# Patient Record
Sex: FEMALE | Race: WHITE | NOT HISPANIC OR LATINO | Employment: STUDENT | URBAN - METROPOLITAN AREA
[De-identification: names, ages, dates, MRNs, and addresses within clinical notes are randomized per-mention and may not be internally consistent; named-entity substitution may affect disease eponyms.]

---

## 2018-05-17 ENCOUNTER — TELEPHONE (OUTPATIENT)
Dept: MATERNAL FETAL MEDICINE | Facility: CLINIC | Age: 23
End: 2018-05-17

## 2018-05-17 DIAGNOSIS — O35.9XX0 KNOWN FETAL ANOMALY, ANTEPARTUM, SINGLE OR UNSPECIFIED FETUS: Primary | ICD-10-CM

## 2018-05-17 NOTE — TELEPHONE ENCOUNTER
Pt informed of upcoming MFM and pediatric surgery appointments on 5/23/18. MFM at [facility] reviewed location information and then Dr. Armenta at [facility] - clinic 6th floor after NICU tour. Pt verbalized understanding of information.

## 2018-05-23 ENCOUNTER — OFFICE VISIT (OUTPATIENT)
Dept: SURGERY | Facility: CLINIC | Age: 23
End: 2018-05-23
Attending: SURGERY
Payer: MEDICAID

## 2018-05-23 ENCOUNTER — SOCIAL WORK (OUTPATIENT)
Dept: CASE MANAGEMENT | Facility: OTHER | Age: 23
End: 2018-05-23

## 2018-05-23 ENCOUNTER — OFFICE VISIT (OUTPATIENT)
Dept: MATERNAL FETAL MEDICINE | Facility: CLINIC | Age: 23
End: 2018-05-23
Attending: OBSTETRICS & GYNECOLOGY
Payer: MEDICAID

## 2018-05-23 DIAGNOSIS — O35.9XX0 KNOWN FETAL ANOMALY, ANTEPARTUM, SINGLE OR UNSPECIFIED FETUS: ICD-10-CM

## 2018-05-23 DIAGNOSIS — O35.9XX0 KNOWN FETAL ANOMALY, ANTEPARTUM, SINGLE OR UNSPECIFIED FETUS: Primary | ICD-10-CM

## 2018-05-23 PROCEDURE — 99201 PR OFFICE/OUTPT VISIT,NEW,LEVL I: CPT | Mod: S$PBB,,, | Performed by: SURGERY

## 2018-05-23 PROCEDURE — 76815 OB US LIMITED FETUS(S): CPT | Mod: PBBFAC | Performed by: OBSTETRICS & GYNECOLOGY

## 2018-05-23 PROCEDURE — 76819 FETAL BIOPHYS PROFIL W/O NST: CPT | Mod: PBBFAC | Performed by: OBSTETRICS & GYNECOLOGY

## 2018-05-23 PROCEDURE — 99213 OFFICE O/P EST LOW 20 MIN: CPT | Mod: S$PBB,TH,25, | Performed by: OBSTETRICS & GYNECOLOGY

## 2018-05-23 PROCEDURE — 99999 PR PBB SHADOW E&M-EST. PATIENT-LVL II: CPT | Mod: PBBFAC,,, | Performed by: SURGERY

## 2018-05-23 PROCEDURE — 99212 OFFICE O/P EST SF 10 MIN: CPT | Mod: PBBFAC | Performed by: SURGERY

## 2018-05-23 PROCEDURE — 76819 FETAL BIOPHYS PROFIL W/O NST: CPT | Mod: 26,S$PBB,, | Performed by: OBSTETRICS & GYNECOLOGY

## 2018-05-23 PROCEDURE — 76815 OB US LIMITED FETUS(S): CPT | Mod: 26,S$PBB,, | Performed by: OBSTETRICS & GYNECOLOGY

## 2018-05-23 NOTE — PROGRESS NOTES
Tour of NICU was provided to pt and . They were grateful for the opportunity. All questions were answered regarding a NICU admission up to this point. NICU handbook provided.      Courtney Lafont, LCSW [facility] [place]  [email or web address]    (phone) [phone] or  Ext. [phone]  (fax) [phone]

## 2018-05-23 NOTE — PROGRESS NOTES
See viewpoint US report  Discussed delivery and timing.  Patient would like some type of sedation for her epidural as she gets very anxious with the procedure.  I will have her talk with anesthesia next visit.  Consider placing epidural the night before after a reactive NST.  The patient was given an opportunity to ask questions about management and the diease process.  She expressed an understanding of and agreement to the above impression and plan. All questions were answered to her satisfaction.  She was given contact information to the [facility] clinic to address further concerns.      The approximate physician face-to-face time was 15 minutes. The majority of the time (>50%) was spent on counseling of the patient or coordination of care.

## 2018-05-24 NOTE — PROGRESS NOTES
23 year old  at 37 w 4 d  Female fetus with a left sided mass suspected to be of renal origin.    Scheduled induction on 6/3 for delivery on .  Two prior vaginal deliveries.    Discussed differential diagnosis with parents and likely plan for post- imaging and then likely resection.    Expect the baby to be asymptomatic and may be able to to to [facility] while obtaining imaging and surgical planning.    V Geovany

## 2018-05-31 DIAGNOSIS — O35.9XX0 KNOWN FETAL ANOMALY, ANTEPARTUM, SINGLE OR UNSPECIFIED FETUS: Primary | ICD-10-CM

## 2018-06-01 ENCOUNTER — OFFICE VISIT (OUTPATIENT)
Dept: ANESTHESIOLOGY | Facility: OTHER | Age: 23
End: 2018-06-01
Attending: OBSTETRICS & GYNECOLOGY
Payer: MEDICAID

## 2018-06-01 ENCOUNTER — OFFICE VISIT (OUTPATIENT)
Dept: MATERNAL FETAL MEDICINE | Facility: CLINIC | Age: 23
End: 2018-06-01
Attending: OBSTETRICS & GYNECOLOGY
Payer: MEDICAID

## 2018-06-01 VITALS — WEIGHT: 179.25 LBS | DIASTOLIC BLOOD PRESSURE: 76 MMHG | SYSTOLIC BLOOD PRESSURE: 100 MMHG

## 2018-06-01 DIAGNOSIS — F41.9 ANXIETY: Primary | ICD-10-CM

## 2018-06-01 DIAGNOSIS — O35.9XX0 KNOWN FETAL ANOMALY, ANTEPARTUM, SINGLE OR UNSPECIFIED FETUS: ICD-10-CM

## 2018-06-01 PROCEDURE — 99212 OFFICE O/P EST SF 10 MIN: CPT | Mod: PBBFAC,TH,25 | Performed by: OBSTETRICS & GYNECOLOGY

## 2018-06-01 PROCEDURE — 76815 OB US LIMITED FETUS(S): CPT | Mod: PBBFAC | Performed by: OBSTETRICS & GYNECOLOGY

## 2018-06-01 PROCEDURE — 99499 UNLISTED E&M SERVICE: CPT | Mod: S$PBB,,, | Performed by: OBSTETRICS & GYNECOLOGY

## 2018-06-01 PROCEDURE — 76819 FETAL BIOPHYS PROFIL W/O NST: CPT | Mod: 26,S$PBB,, | Performed by: OBSTETRICS & GYNECOLOGY

## 2018-06-01 PROCEDURE — 76815 OB US LIMITED FETUS(S): CPT | Mod: 26,S$PBB,, | Performed by: OBSTETRICS & GYNECOLOGY

## 2018-06-01 PROCEDURE — 99999 PR PBB SHADOW E&M-EST. PATIENT-LVL II: CPT | Mod: PBBFAC,,, | Performed by: OBSTETRICS & GYNECOLOGY

## 2018-06-01 PROCEDURE — 76819 FETAL BIOPHYS PROFIL W/O NST: CPT | Mod: PBBFAC | Performed by: OBSTETRICS & GYNECOLOGY

## 2018-06-01 PROCEDURE — 99212 OFFICE O/P EST SF 10 MIN: CPT | Mod: ,,, | Performed by: ANESTHESIOLOGY

## 2018-06-01 NOTE — LETTER
June 4, 2018      Joe Mclean MD  [address]  [facility] [address]           [facility]  [address]  [place] [address]  Phone: [phone]          Patient: Marcela Real   MR Number: [id number]   YOB: 1995   Date of Visit: 6/1/2018       Dear Dr. Joe Mclean:    Thank you for referring Marcela Real to me for evaluation. Attached you will find relevant portions of my assessment and plan of care.    If you have questions, please do not hesitate to call me. I look forward to following Marcela Real along with you.    Sincerely,    Danielle Thompson MD    Enclosure  CC:  No Recipients    If you would like to receive this communication electronically, please contact [email or web address] or [phone] to request more information on [facility] access.    For providers and/or their staff who would like to refer a patient to [facility], please contact us through our one-stop-shop provider referral line, [facility], at [phone].    If you feel you have received this communication in error or would no longer like to receive these types of communications, please e-mail [email or web address]

## 2018-06-01 NOTE — CONSULTS
Consults     Outpatient OB Anesthesia Consult      Date and Time: 06/01/2018 11:06 AM     Request from: Dr. Mclean    CC requesting physician or midwife: Dr. Mclean     Reason for Consult: Anesthetic recommendations for delivery    Initial Consult?: Yes    Chief Complaint: anxiety regarding epidural placement    HPI: Patient is a 23 y.o. year old woman, G 3 P 2 presenting with anxiety regarding epidural placement.     Past medical history:    No past medical history on file.    Past surgical history:    No past surgical history on file.    Family history:    No family history on file.    Social History:    Social History     Social History    Marital status: Single     Spouse name: N/A    Number of children: N/A    Years of education: N/A     Occupational History    Not on file.     Social History Main Topics    Smoking status: Never Smoker    Smokeless tobacco: Never Used    Alcohol use No    Drug use: Unknown    Sexual activity: Yes     Partners: Male     Other Topics Concern    Not on file     Social History Narrative    No narrative on file       Medication:    Current Outpatient Prescriptions on File Prior to Visit   Medication Sig Dispense Refill    prenatal vit,calc76/iron/folic (PNV 29-1 ORAL) Take by mouth.       No current facility-administered medications on file prior to visit.        Allergies:    Patient has no known allergies.    Family or personal history of anesthetic complications: No    Diagnostic Studies: I have reviewed the following relevant findings as noted below:  CBC:No results found for: WBC, HGB, HCT, MCV, PLT   CMP:  No results found for: NA, K, CL, CO2, GLU, BUN, CREATININE, CALCIUM, PROT, ALBUMIN, BILITOT, ALKPHOS, AST, ALT, ANIONGAP, ESTGFRAFRICA, EGFRNONAA  BMP: No results found for: NA, K, CL, CO2, BUN, CREATININE, CALCIUM, ANIONGAP, ESTGFRAFRICA, EGFRNONAA  Coagulation studies: No results found for: PT, INR, APTT  Echo: No results found in the last 24 hours.  CT:  No results found in the last 24 hours.  MRI: No results found in the last 24 hours.      Review of Systems:   Constitutional: Negative for fever and chills; well appearing female  Eyes: no visual changes  Ear, nose, mouth and throat: no loose or broken teeth  Cardiovascular: no chest pain  Respiratory: no shortness of breath  Gastrointestinal: no nausea or vomiting  Genitourinary: no dysuria  Musculoskeletal: no joint pain  Skin: warm and dry, no rashes  Neurologic: no seizures  Psychiatric: no anxiety or depression  Endocrinology: no heat or cold intolerance  Hematologic: does not bruise or bleed easily      Physical Exam:  Vitals:    Blood Pressure: 100/76   Respiratory Rate: 14  Constitutional: alert, no distress  Eyes: normal lids, pupils symmetric  Ear, nose, mouth and throat: Mallampati I, normal thyromental distance, normal lips, teeth, gums and tongue   Cardiovascular: normal rate, no murmurs  Respiratory: normal effort, no wheezes  Musculoskeletal: normal gait, normal range of motion  Skin: no rashes, no induration  Neurologic: normal reflexes and sensation  Psychiatric: oriented to person, place, time; normal affect    ASA Score: 2    Diagnosis: anxiety regarding epidural placement    Assessment and Plan:  Patient is  at 38w6d who presents with anxiety regarding epidural placement. She states that she jumped during her prior epidural placement and is concerned that she will move again during placement. I explained that many patients do move/jump during skin localization and that is completely normal. I told her that we could give her something for anxiety and I recommended early epidural placement for her induction on [place] 3. I explained that we would localize her skin and we can give additional skin localization, should she need it.    Complexity: Moderate    Entertained and answered all questions to the patient's satisfaction.      Danielle Thompson MD

## 2018-06-03 ENCOUNTER — HOSPITAL ENCOUNTER (INPATIENT)
Facility: OTHER | Age: 23
LOS: 3 days | Discharge: HOME | End: 2018-06-06
Attending: OBSTETRICS & GYNECOLOGY | Admitting: OBSTETRICS & GYNECOLOGY
Payer: MEDICAID

## 2018-06-03 ENCOUNTER — ANESTHESIA EVENT (OUTPATIENT)
Dept: OBSTETRICS AND GYNECOLOGY | Facility: OTHER | Age: 23
End: 2018-06-03
Payer: MEDICAID

## 2018-06-03 ENCOUNTER — ANESTHESIA (OUTPATIENT)
Dept: OBSTETRICS AND GYNECOLOGY | Facility: OTHER | Age: 23
End: 2018-06-03
Payer: MEDICAID

## 2018-06-03 LAB
ABO + RH BLD: NORMAL
BASOPHILS # BLD AUTO: 0.01 K/UL
BASOPHILS NFR BLD: 0.1 %
BLD GP AB SCN CELLS X3 SERPL QL: NORMAL
DIFFERENTIAL METHOD: ABNORMAL
EOSINOPHIL # BLD AUTO: 0.1 K/UL
EOSINOPHIL NFR BLD: 0.5 %
ERYTHROCYTE [DISTWIDTH] IN BLOOD BY AUTOMATED COUNT: 13.5 %
HCT VFR BLD AUTO: 35 %
HGB BLD-MCNC: 11.6 G/DL
LYMPHOCYTES # BLD AUTO: 2.4 K/UL
LYMPHOCYTES NFR BLD: 17.6 %
MCH RBC QN AUTO: 29.8 PG
MCHC RBC AUTO-ENTMCNC: 33.1 G/DL
MCV RBC AUTO: 90 FL
MONOCYTES # BLD AUTO: 0.7 K/UL
MONOCYTES NFR BLD: 5.4 %
NEUTROPHILS # BLD AUTO: 10.2 K/UL
NEUTROPHILS NFR BLD: 76 %
PLATELET # BLD AUTO: 237 K/UL
PMV BLD AUTO: 9.8 FL
RBC # BLD AUTO: 3.89 M/UL
WBC # BLD AUTO: 13.44 K/UL

## 2018-06-03 PROCEDURE — 86901 BLOOD TYPING SEROLOGIC RH(D): CPT

## 2018-06-03 PROCEDURE — 11000001 HC ACUTE MED/SURG PRIVATE ROOM

## 2018-06-03 PROCEDURE — 85025 COMPLETE CBC W/AUTO DIFF WBC: CPT

## 2018-06-03 PROCEDURE — 86592 SYPHILIS TEST NON-TREP QUAL: CPT

## 2018-06-03 PROCEDURE — 36415 COLL VENOUS BLD VENIPUNCTURE: CPT

## 2018-06-03 PROCEDURE — 25000003 PHARM REV CODE 250: Performed by: STUDENT IN AN ORGANIZED HEALTH CARE EDUCATION/TRAINING PROGRAM

## 2018-06-03 PROCEDURE — 86703 HIV-1/HIV-2 1 RESULT ANTBDY: CPT

## 2018-06-03 PROCEDURE — 3E0P7VZ INTRODUCTION OF HORMONE INTO FEMALE REPRODUCTIVE, VIA NATURAL OR ARTIFICIAL OPENING: ICD-10-PCS | Performed by: OBSTETRICS & GYNECOLOGY

## 2018-06-03 RX ORDER — OXYTOCIN/RINGER'S LACTATE 20/1000 ML
20 PLASTIC BAG, INJECTION (ML) INTRAVENOUS ONCE
Status: CANCELLED | OUTPATIENT
Start: 2018-06-03 | End: 2018-06-03

## 2018-06-03 RX ORDER — SODIUM CHLORIDE, SODIUM LACTATE, POTASSIUM CHLORIDE, CALCIUM CHLORIDE 600; 310; 30; 20 MG/100ML; MG/100ML; MG/100ML; MG/100ML
INJECTION, SOLUTION INTRAVENOUS CONTINUOUS
Status: DISCONTINUED | OUTPATIENT
Start: 2018-06-03 | End: 2018-06-04

## 2018-06-03 RX ORDER — METHYLERGONOVINE MALEATE 0.2 MG/ML
200 INJECTION INTRAVENOUS
Status: CANCELLED | OUTPATIENT
Start: 2018-06-03

## 2018-06-03 RX ORDER — MISOPROSTOL 200 UG/1
600 TABLET ORAL
Status: CANCELLED | OUTPATIENT
Start: 2018-06-03

## 2018-06-03 RX ORDER — SODIUM CHLORIDE 9 MG/ML
INJECTION, SOLUTION INTRAVENOUS
Status: DISCONTINUED | OUTPATIENT
Start: 2018-06-03 | End: 2018-06-04

## 2018-06-03 RX ORDER — ONDANSETRON 8 MG/1
8 TABLET, ORALLY DISINTEGRATING ORAL EVERY 8 HOURS PRN
Status: DISCONTINUED | OUTPATIENT
Start: 2018-06-03 | End: 2018-06-04

## 2018-06-03 RX ORDER — OXYTOCIN/RINGER'S LACTATE 20/1000 ML
41.7 PLASTIC BAG, INJECTION (ML) INTRAVENOUS CONTINUOUS
Status: DISCONTINUED | OUTPATIENT
Start: 2018-06-03 | End: 2018-06-04

## 2018-06-03 RX ORDER — OXYTOCIN/RINGER'S LACTATE 20/1000 ML
41.65 PLASTIC BAG, INJECTION (ML) INTRAVENOUS CONTINUOUS
Status: CANCELLED | OUTPATIENT
Start: 2018-06-03 | End: 2018-06-04

## 2018-06-03 RX ORDER — OXYTOCIN/RINGER'S LACTATE 20/1000 ML
333 PLASTIC BAG, INJECTION (ML) INTRAVENOUS CONTINUOUS
Status: ACTIVE | OUTPATIENT
Start: 2018-06-03 | End: 2018-06-03

## 2018-06-03 RX ORDER — CARBOPROST TROMETHAMINE 250 UG/ML
250 INJECTION, SOLUTION INTRAMUSCULAR
Status: CANCELLED | OUTPATIENT
Start: 2018-06-03

## 2018-06-03 RX ORDER — OXYTOCIN/RINGER'S LACTATE 20/1000 ML
20 PLASTIC BAG, INJECTION (ML) INTRAVENOUS ONCE
Status: DISCONTINUED | OUTPATIENT
Start: 2018-06-03 | End: 2018-06-04

## 2018-06-03 RX ADMIN — SODIUM CHLORIDE, SODIUM LACTATE, POTASSIUM CHLORIDE, AND CALCIUM CHLORIDE: .6; .31; .03; .02 INJECTION, SOLUTION INTRAVENOUS at 09:06

## 2018-06-03 RX ADMIN — DINOPROSTONE 10 MG: 10 INSERT VAGINAL at 10:06

## 2018-06-04 LAB
ALLENS TEST: ABNORMAL
ALLENS TEST: ABNORMAL
DELSYS: ABNORMAL
DELSYS: ABNORMAL
HCO3 UR-SCNC: 21.6 MMOL/L (ref 24–28)
HCO3 UR-SCNC: 23.8 MMOL/L (ref 24–28)
HIV 1+2 AB+HIV1 P24 AG SERPL QL IA: NEGATIVE
MODE: ABNORMAL
MODE: ABNORMAL
PCO2 BLDA: 32.9 MMHG (ref 35–45)
PCO2 BLDA: 46 MMHG (ref 35–45)
PH SMN: 7.32 [PH] (ref 7.35–7.45)
PH SMN: 7.42 [PH] (ref 7.35–7.45)
PO2 BLDA: 17 MMHG (ref 80–100)
PO2 BLDA: 33 MMHG (ref 80–100)
POC BE: -2 MMOL/L
POC BE: -3 MMOL/L
POC SATURATED O2: 22 % (ref 95–100)
POC SATURATED O2: 66 % (ref 95–100)
POCT GLUCOSE: 69 MG/DL (ref 70–110)
RPR SER QL: NORMAL
SAMPLE: ABNORMAL
SAMPLE: ABNORMAL
SITE: ABNORMAL
SITE: ABNORMAL

## 2018-06-04 PROCEDURE — 25000003 PHARM REV CODE 250: Performed by: OBSTETRICS & GYNECOLOGY

## 2018-06-04 PROCEDURE — 25000003 PHARM REV CODE 250: Performed by: STUDENT IN AN ORGANIZED HEALTH CARE EDUCATION/TRAINING PROGRAM

## 2018-06-04 PROCEDURE — 59409 OBSTETRICAL CARE: CPT | Mod: GB,,, | Performed by: OBSTETRICS & GYNECOLOGY

## 2018-06-04 PROCEDURE — 27200710 HC EPIDURAL INFUSION PUMP SET: Performed by: STUDENT IN AN ORGANIZED HEALTH CARE EDUCATION/TRAINING PROGRAM

## 2018-06-04 PROCEDURE — 62326 NJX INTERLAMINAR LMBR/SAC: CPT | Performed by: ANESTHESIOLOGY

## 2018-06-04 PROCEDURE — 82803 BLOOD GASES ANY COMBINATION: CPT

## 2018-06-04 PROCEDURE — 10907ZC DRAINAGE OF AMNIOTIC FLUID, THERAPEUTIC FROM PRODUCTS OF CONCEPTION, VIA NATURAL OR ARTIFICIAL OPENING: ICD-10-PCS | Performed by: OBSTETRICS & GYNECOLOGY

## 2018-06-04 PROCEDURE — 63600175 PHARM REV CODE 636 W HCPCS: Performed by: STUDENT IN AN ORGANIZED HEALTH CARE EDUCATION/TRAINING PROGRAM

## 2018-06-04 PROCEDURE — 72100003 HC LABOR CARE, EA. ADDL. 8 HRS

## 2018-06-04 PROCEDURE — 72100002 HC LABOR CARE, 1ST 8 HOURS

## 2018-06-04 PROCEDURE — 59409 OBSTETRICAL CARE: CPT | Mod: AA,,, | Performed by: ANESTHESIOLOGY

## 2018-06-04 PROCEDURE — 99900035 HC TECH TIME PER 15 MIN (STAT)

## 2018-06-04 PROCEDURE — 27800517 HC TRAY,EPIDURAL-CONTINUOUS: Performed by: STUDENT IN AN ORGANIZED HEALTH CARE EDUCATION/TRAINING PROGRAM

## 2018-06-04 PROCEDURE — 72200005 HC VAGINAL DELIVERY LEVEL II

## 2018-06-04 PROCEDURE — 51702 INSERT TEMP BLADDER CATH: CPT

## 2018-06-04 PROCEDURE — 11000001 HC ACUTE MED/SURG PRIVATE ROOM

## 2018-06-04 RX ORDER — OXYTOCIN/RINGER'S LACTATE 20/1000 ML
2 PLASTIC BAG, INJECTION (ML) INTRAVENOUS CONTINUOUS
Status: DISCONTINUED | OUTPATIENT
Start: 2018-06-04 | End: 2018-06-04

## 2018-06-04 RX ORDER — HYDROCODONE BITARTRATE AND ACETAMINOPHEN 10; 325 MG/1; MG/1
1 TABLET ORAL EVERY 4 HOURS PRN
Status: DISCONTINUED | OUTPATIENT
Start: 2018-06-04 | End: 2018-06-06 | Stop reason: HOSPADM

## 2018-06-04 RX ORDER — MISOPROSTOL 200 UG/1
200 TABLET ORAL EVERY 6 HOURS
Status: COMPLETED | OUTPATIENT
Start: 2018-06-04 | End: 2018-06-05

## 2018-06-04 RX ORDER — MIDAZOLAM HYDROCHLORIDE 1 MG/ML
INJECTION INTRAMUSCULAR; INTRAVENOUS
Status: DISCONTINUED | OUTPATIENT
Start: 2018-06-04 | End: 2018-06-04

## 2018-06-04 RX ORDER — MISOPROSTOL 100 UG/1
100 TABLET ORAL EVERY 6 HOURS
Status: DISCONTINUED | OUTPATIENT
Start: 2018-06-04 | End: 2018-06-04

## 2018-06-04 RX ORDER — IBUPROFEN 600 MG/1
600 TABLET ORAL EVERY 6 HOURS
Status: DISCONTINUED | OUTPATIENT
Start: 2018-06-04 | End: 2018-06-06 | Stop reason: HOSPADM

## 2018-06-04 RX ORDER — CARBOPROST TROMETHAMINE 250 UG/ML
INJECTION, SOLUTION INTRAMUSCULAR
Status: DISCONTINUED
Start: 2018-06-04 | End: 2018-06-04 | Stop reason: WASHOUT

## 2018-06-04 RX ORDER — OXYTOCIN/RINGER'S LACTATE 20/1000 ML
41.65 PLASTIC BAG, INJECTION (ML) INTRAVENOUS CONTINUOUS
Status: ACTIVE | OUTPATIENT
Start: 2018-06-04 | End: 2018-06-05

## 2018-06-04 RX ORDER — LIDOCAINE HYDROCHLORIDE AND EPINEPHRINE 15; 5 MG/ML; UG/ML
INJECTION, SOLUTION EPIDURAL
Status: DISCONTINUED | OUTPATIENT
Start: 2018-06-04 | End: 2018-06-04

## 2018-06-04 RX ORDER — HYDROCODONE BITARTRATE AND ACETAMINOPHEN 5; 325 MG/1; MG/1
1 TABLET ORAL EVERY 4 HOURS PRN
Status: DISCONTINUED | OUTPATIENT
Start: 2018-06-04 | End: 2018-06-06 | Stop reason: HOSPADM

## 2018-06-04 RX ORDER — METHYLERGONOVINE MALEATE 0.2 MG/ML
INJECTION INTRAVENOUS
Status: DISCONTINUED
Start: 2018-06-04 | End: 2018-06-04 | Stop reason: WASHOUT

## 2018-06-04 RX ORDER — FENTANYL/BUPIVACAINE/NS/PF 2MCG/ML-.1
PLASTIC BAG, INJECTION (ML) INJECTION CONTINUOUS PRN
Status: DISCONTINUED | OUTPATIENT
Start: 2018-06-04 | End: 2018-06-04

## 2018-06-04 RX ORDER — MISOPROSTOL 200 UG/1
TABLET ORAL
Status: COMPLETED
Start: 2018-06-04 | End: 2018-06-04

## 2018-06-04 RX ORDER — DEXTROSE, SODIUM CHLORIDE, SODIUM LACTATE, POTASSIUM CHLORIDE, AND CALCIUM CHLORIDE 5; .6; .31; .03; .02 G/100ML; G/100ML; G/100ML; G/100ML; G/100ML
INJECTION, SOLUTION INTRAVENOUS CONTINUOUS
Status: DISCONTINUED | OUTPATIENT
Start: 2018-06-04 | End: 2018-06-04

## 2018-06-04 RX ADMIN — SODIUM CHLORIDE, SODIUM LACTATE, POTASSIUM CHLORIDE, AND CALCIUM CHLORIDE 1000 ML: .6; .31; .03; .02 INJECTION, SOLUTION INTRAVENOUS at 04:06

## 2018-06-04 RX ADMIN — DEXTROSE, SODIUM CHLORIDE, SODIUM LACTATE, POTASSIUM CHLORIDE, AND CALCIUM CHLORIDE: 5; .6; .31; .03; .02 INJECTION, SOLUTION INTRAVENOUS at 01:06

## 2018-06-04 RX ADMIN — MIDAZOLAM HYDROCHLORIDE 2 MG: 1 INJECTION, SOLUTION INTRAMUSCULAR; INTRAVENOUS at 04:06

## 2018-06-04 RX ADMIN — SODIUM CHLORIDE: 0.9 INJECTION, SOLUTION INTRAVENOUS at 04:06

## 2018-06-04 RX ADMIN — Medication 10 ML/HR: at 04:06

## 2018-06-04 RX ADMIN — Medication 2 MILLI-UNITS/MIN: at 10:06

## 2018-06-04 RX ADMIN — IBUPROFEN 600 MG: 600 TABLET, FILM COATED ORAL at 10:06

## 2018-06-04 RX ADMIN — MISOPROSTOL 200 MCG: 200 TABLET ORAL at 10:06

## 2018-06-04 RX ADMIN — LIDOCAINE HYDROCHLORIDE,EPINEPHRINE BITARTRATE 3 ML: 15; .005 INJECTION, SOLUTION EPIDURAL; INFILTRATION; INTRACAUDAL; PERINEURAL at 04:06

## 2018-06-04 RX ADMIN — HYDROCODONE BITARTRATE AND ACETAMINOPHEN 1 TABLET: 5; 325 TABLET ORAL at 10:06

## 2018-06-04 RX ADMIN — SODIUM CHLORIDE, SODIUM LACTATE, POTASSIUM CHLORIDE, AND CALCIUM CHLORIDE: .6; .31; .03; .02 INJECTION, SOLUTION INTRAVENOUS at 10:06

## 2018-06-04 RX ADMIN — SODIUM CHLORIDE, SODIUM LACTATE, POTASSIUM CHLORIDE, AND CALCIUM CHLORIDE 500 ML: .6; .31; .03; .02 INJECTION, SOLUTION INTRAVENOUS at 03:06

## 2018-06-04 RX ADMIN — Medication 2 MILLI-UNITS/MIN: at 05:06

## 2018-06-04 NOTE — PLAN OF CARE
Problem: Labor (Cervical Ripen, Induct, Augment) (Adult,Obstetrics,Pediatric)  Goal: Signs and Symptoms of Listed Potential Problems Will be Absent, Minimized or Managed (Labor)  Signs and symptoms of listed potential problems will be absent, minimized or managed by discharge/transition of care (reference Labor (Cervical Ripen, Induct, Augment) (Adult,Obstetrics,Pediatric) CPG).   PT with epidural in place. No pain or needs expressed. Will continue to monitor.

## 2018-06-04 NOTE — ANESTHESIA PROCEDURE NOTES
Epidural    Patient location during procedure: OB   Reason for block: primary anesthetic   Diagnosis: Labor   Start time: 6/4/2018 4:39 AM  Timeout: 6/4/2018 4:38 AM  End time: 6/4/2018 4:54 AM  Surgery related to: Vaginal Delivery  Staffing  Anesthesiologist: TIGRE RIOS  Resident/CRNA: JUAN YORK  Performed: resident/CRNA   Preanesthetic Checklist  Completed: patient identified, site marked, surgical consent, pre-op evaluation, timeout performed, IV checked, risks and benefits discussed, monitors and equipment checked, anesthesia consent given, hand hygiene performed and patient being monitored  Preparation  Patient position: sitting  Prep: ChloraPrep  Patient monitoring: Pulse Ox  Epidural  Skin Anesthetic: lidocaine 1%  Skin Wheal: 3 mL  Administration type: continuous  Approach: midline  Interspace: L3-4  Injection technique: FOREIGN saline  Needle and Epidural Catheter  Needle type: Tuohy   Needle gauge: 17  Needle length: 3.5 inches  Needle insertion depth: 5 cm  Catheter type: springwound  Catheter size: 19 G  Catheter at skin depth: 10 cm  Test dose: 3 mL of lidocaine 1.5% with Epi 1-to-200,000  Additional Documentation: incremental injection, negative aspiration for heme and CSF, no paresthesia on injection, no signs/symptoms of IV or SA injection, no significant pain on injection and no significant complaints from patient  Needle localization: anatomical landmarks  Medications:  Bolus administered: 10 mL of  Volume per aspiration: 5 mL  Time between aspirations: 5 minutes  Assessment  Upper dermatomal levels - Left: T9  Right: T9   Dermatomal levels determined by ice  Ease of block: easy  Patient's tolerance of the procedure: comfortable throughout block and no complaints  Post dural Puncture Headache?: No

## 2018-06-04 NOTE — ASSESSMENT & PLAN NOTE
- Consents signed and to chart  - Admit to Labor and Delivery unit  - Plan for induction is cervidil   - Epidural per Anesthesia  - Draw CBC, T&S  - Notify Staff  - Ultrasound performed, fetus in vertex position.   - Recheck in 12 hrs or PRN  - Post-Partum Hemorrhage risk - low

## 2018-06-04 NOTE — ANESTHESIA PREPROCEDURE EVALUATION
2018  Marcela Real is a 23 y.o. female  at 39w1d with PMHx of  x2 at term who presents for IOL.     OB History    Para Term  AB Living   3 2 2     2   SAB TAB Ectopic Multiple Live Births           2      # Outcome Date GA Lbr Donald/2nd Weight Sex Delivery Anes PTL Lv   3 Current            2 Term 2014        MIKAYLA   1 Term 2012     Vag-Spont  N MIKAYLA          Wt Readings from Last 1 Encounters:   18 1016 81.3 kg (179 lb 3.7 oz)       BP Readings from Last 3 Encounters:   18 100/76   18 (!) 143/67       Patient Active Problem List   Diagnosis    Fetal tumor    Indication for care in labor or delivery       No past surgical history on file.    Social History     Social History    Marital status: Single     Spouse name: N/A    Number of children: N/A    Years of education: N/A     Occupational History    Not on file.     Social History Main Topics    Smoking status: Never Smoker    Smokeless tobacco: Never Used    Alcohol use No    Drug use: Unknown    Sexual activity: Yes     Partners: Male     Other Topics Concern    Not on file     Social History Narrative    No narrative on file         Chemistry    No results found for: NA, K, CL, CO2, BUN, CREATININE, GLU No results found for: CALCIUM, ALKPHOS, AST, ALT, BILITOT, ESTGFRAFRICA, EGFRNONAA         No results found for: WBC, HGB, HCT, MCV, PLT    No results for input(s): PT, INR, PROTIME, APTT in the last 72 hours.      Anesthesia Evaluation    I have reviewed the Patient Summary Reports.     I have reviewed the Medications.     Review of Systems  Anesthesia Hx:  No previous Anesthesia  Neg history of prior surgery. Denies Family Hx of Anesthesia complications.   Denies Personal Hx of Anesthesia complications.   Hematology/Oncology:  Hematology Normal   Oncology Normal     EENT/Dental:EENT/Dental  Normal   Cardiovascular:  Cardiovascular Normal     Pulmonary:  Pulmonary Normal    Renal/:  Renal/ Normal     Hepatic/GI:  Hepatic/GI Normal    Neurological:  Neurology Normal    Endocrine:  Endocrine Normal        Physical Exam  General:  Well nourished    Airway/Jaw/Neck:  Airway Findings: Mouth Opening: Normal Tongue: Normal  General Airway Assessment: Adult  Mallampati: II  Improves to I with phonation.  TM Distance: Normal, at least 6 cm     Eyes/Ears/Nose:  EYES/EARS/NOSE FINDINGS: Normal   Dental:  Dental Findings: In tact   Chest/Lungs:  Chest/Lungs Findings: Clear to auscultation, Normal Respiratory Rate     Heart/Vascular:  Heart Findings: Rate: Normal        Mental Status:  Mental Status Findings:  Cooperative, Alert and Oriented         Anesthesia Plan  Type of Anesthesia, risks & benefits discussed:  Anesthesia Type:  CSE, epidural, general, MAC, spinal  Patient's Preference:   Intra-op Monitoring Plan: standard ASA monitors  Intra-op Monitoring Plan Comments:   Post Op Pain Control Plan:   Post Op Pain Control Plan Comments:   Induction:   IV  Beta Blocker:  Patient is not currently on a Beta-Blocker (No further documentation required).       Informed Consent: Patient understands risks and agrees with Anesthesia plan.  Questions answered. Anesthesia consent signed with patient.  ASA Score: 2     Day of Surgery Review of History & Physical: I have interviewed and examined the patient. I have reviewed the patient's H&P dated:    H&P update referred to the surgeon.         Ready For Surgery From Anesthesia Perspective.

## 2018-06-04 NOTE — SUBJECTIVE & OBJECTIVE
Obstetric History       T2      L2     SAB0   TAB0   Ectopic0   Multiple0   Live Births2       # Outcome Date GA Lbr Donald/2nd Weight Sex Delivery Anes PTL Lv   3 Current            2 Term         MIKAYLA   1 Term      Vag-Spont  N MIKAYLA        No past medical history on file.  No past surgical history on file.    PTA Medications   Medication Sig    prenatal vit,calc76/iron/folic (PNV 29-1 ORAL) Take by mouth.       Review of patient's allergies indicates:  No Known Allergies     Family History     None        Social History Main Topics    Smoking status: Never Smoker    Smokeless tobacco: Never Used    Alcohol use No    Drug use: Unknown    Sexual activity: Yes     Partners: Male     Review of Systems   Constitutional: Negative for chills and fever.   Respiratory: Negative for shortness of breath.    Cardiovascular: Negative for chest pain and leg swelling.   Gastrointestinal: Negative for abdominal pain, nausea and vomiting.   Genitourinary: Negative for dysuria, pelvic pain and vaginal bleeding.   Neurological: Negative for headaches.      Objective:     Vital Signs (Most Recent):    Vital Signs (24h Range):           There is no height or weight on file to calculate BMI.    FHT: 145 bpm, mod btbv, +accels, - decels, Cat 1 (reassuring)  TOCO:  Q 10 minutes, irregular    Physical Exam:   Constitutional: She is oriented to person, place, and time. She appears well-developed and well-nourished. No distress.    HENT:   Head: Normocephalic and atraumatic.    Eyes: EOM are normal.     Cardiovascular: Normal rate and regular rhythm.  Exam reveals no edema.     Pulmonary/Chest: Effort normal and breath sounds normal.        Abdominal: Soft. Bowel sounds are normal. She exhibits no distension. There is no tenderness. There is no rebound and no guarding.   Gravid, size = dates     Genitourinary: Uterus normal.               Neurological: She is alert and oriented to person, place, and time.    Skin:  Skin is warm and dry.    Psychiatric: She has a normal mood and affect.       Cervix:  Dilation:  1  Effacement:  50%  Station: -3  Presentation: Vertex     Significant Labs:  No results found for: GROUPTRH, HEPBSAG, RUBELLAIGGSC, STREPBCULT, AFP, [id number]    I have personallly reviewed all pertinent lab results from the last 24 hours.

## 2018-06-04 NOTE — HPI
Marcela Real is a 23 y.o.  female with IUP at 39w1d weeks gestation who is admitted for an induction of labor secondary to fetal abdominal mass.     This IUP is complicated by fetal abdominal mass and h/o placental abruption.  Patient denies contractions, denies vaginal bleeding, denies LOF.   Fetal Movement: normal.

## 2018-06-04 NOTE — PROGRESS NOTES
Pt presents to L&D for scheduled induction. REports positive fm; denies lof, vb or ctx at this time. Pt shown to LDR 2. Oriented to room.

## 2018-06-04 NOTE — PROGRESS NOTES
LABOR NOTE    S:  Complaints: No.  Epidural working:  yes    O: /67   Pulse 80   Temp 99.2 °F (37.3 °C)   Resp 18   Wt 81.3 kg (179 lb 3.7 oz)   SpO2 97%   Breastfeeding? No     FHT: 130 bpm, moderate btbv, +accels, no decels,  Cat 1 (reassuring)  CTX: q 3-4 min   SVE: /-2  Pit @ 10mU/min    ASSESSMENT:   23 y.o.  at 39w2d, IOL    FHT reassuring    Active Hospital Problems    Diagnosis  POA    Indication for care in labor or delivery [O75.9]  Yes      Resolved Hospital Problems    Diagnosis Date Resolved POA   No resolved problems to display.     PLAN:    Continue Close Maternal/Fetal Monitoring  Labor course:   2300:  Cervidil  1000: 3/70/-2, AROM clear, start pitocin  1230: 3/70/-2, IUPC placed, pitocin @ 2 mU  1430: /-2, pit @ 10mU  Recheck 2 hours or PRN    Josette Roberto MD  PGY-3 OB/GYN  [phone]

## 2018-06-04 NOTE — PROGRESS NOTES
LABOR NOTE    S:  Complaints: No.  Epidural working:  yes    O: /64   Pulse 77   Temp 99.2 °F (37.3 °C)   Resp 18   Wt 81.3 kg (179 lb 3.7 oz)   SpO2 97%   Breastfeeding? No       FHT: 130bpm, moderate btbv, +accels, no decels,  Cat 1 (reassuring)  CTX: irregular  SVE: 3/70/-2      ASSESSMENT:   23 y.o.  at 39w2d, IOL    FHT reassuring    Active Hospital Problems    Diagnosis  POA    Indication for care in labor or delivery [O75.9]  Yes      Resolved Hospital Problems    Diagnosis Date Resolved POA   No resolved problems to display.         PLAN:    Continue Close Maternal/Fetal Monitoring  S/p cervidil  AROM clear  Start Pitocin Augmentation per protocol  Recheck 2 hours or PRN    Josette Roberto MD  PGY-3 OB/GYN  [phone]

## 2018-06-04 NOTE — PROGRESS NOTES
LABOR NOTE    S:  Complaints: No.  Epidural working:  yes    O: /70   Pulse 78   Temp 99.2 °F (37.3 °C)   Resp 18   Wt 81.3 kg (179 lb 3.7 oz)   SpO2 99%   Breastfeeding? No     FHT: 130 bpm, moderate btbv, +accels, no decels,  Cat 1 (reassuring)  CTX: q 3-4 min   SVE: 3/70/-2    ASSESSMENT:   23 y.o.  at 39w2d, IOL    FHT reassuring    Active Hospital Problems    Diagnosis  POA    Indication for care in labor or delivery [O75.9]  Yes      Resolved Hospital Problems    Diagnosis Date Resolved POA   No resolved problems to display.     PLAN:    Continue Close Maternal/Fetal Monitoring  Labor course:   2300:  Cervidil  1000: 3/70/-2, AROM clear, start pitocin  1230: 3/70/-2, IUPC placed, pitocin @ 2 mU  Recheck 2 hours or PRN    Francy Grant MD, PhD  OBGYN, PGY-2

## 2018-06-04 NOTE — HOSPITAL COURSE
2018 Patient admitted for scheduled IOL 2/2 fetal abd mass. Cervidil induction.   2018 PPD #1 s/p uncomplicated .  cc. No issues overnight. Bleeding is mild. Pain well controlled. Tolerating PO. Voiding spontaneously.   2018 PPD#2 Doing well. Meeting all post partum milestones. Medically stable for discharge to home today. Discharge instructions discussed. Routine follow up in 6 weeks.

## 2018-06-04 NOTE — H&P
[facility]  Obstetrics  History & Physical    Patient Name: Marcela Real  MRN: [id number]  Admission Date: 6/3/2018  Primary Care Provider: Primary Doctor No    Subjective:     Principal Problem:<principal problem not specified>    History of Present Illness:   Marcela Real is a 23 y.o.  female with IUP at 39w1d weeks gestation who is admitted for an induction of labor secondary to fetal abdominal mass.     This IUP is complicated by fetal abdominal mass and h/o placental abruption.  Patient denies contractions, denies vaginal bleeding, denies LOF.   Fetal Movement: normal.           Obstetric History       T2      L2     SAB0   TAB0   Ectopic0   Multiple0   Live Births2       # Outcome Date GA Lbr Donald/2nd Weight Sex Delivery Anes PTL Lv   3 Current            2 Term         MIKAYLA   1 Term      Vag-Spont  N MIKAYLA        No past medical history on file.  No past surgical history on file.    PTA Medications   Medication Sig    prenatal vit,calc76/iron/folic (PNV 29-1 ORAL) Take by mouth.       Review of patient's allergies indicates:  No Known Allergies     Family History     None        Social History Main Topics    Smoking status: Never Smoker    Smokeless tobacco: Never Used    Alcohol use No    Drug use: Unknown    Sexual activity: Yes     Partners: Male     Review of Systems   Constitutional: Negative for chills and fever.   Respiratory: Negative for shortness of breath.    Cardiovascular: Negative for chest pain and leg swelling.   Gastrointestinal: Negative for abdominal pain, nausea and vomiting.   Genitourinary: Negative for dysuria, pelvic pain and vaginal bleeding.   Neurological: Negative for headaches.      Objective:     Vital Signs (Most Recent):    Vital Signs (24h Range):           There is no height or weight on file to calculate BMI.    FHT: 145 bpm, mod btbv, +accels, - decels, Cat 1 (reassuring)  TOCO:  Q 10 minutes, irregular    Physical  Exam:   Constitutional: She is oriented to person, place, and time. She appears well-developed and well-nourished. No distress.    HENT:   Head: Normocephalic and atraumatic.    Eyes: EOM are normal.     Cardiovascular: Normal rate and regular rhythm.  Exam reveals no edema.     Pulmonary/Chest: Effort normal and breath sounds normal.        Abdominal: Soft. Bowel sounds are normal. She exhibits no distension. There is no tenderness. There is no rebound and no guarding.   Gravid, size = dates     Genitourinary: Uterus normal.               Neurological: She is alert and oriented to person, place, and time.    Skin: Skin is warm and dry.    Psychiatric: She has a normal mood and affect.       Cervix:  Dilation:  2  Effacement:  70%  Station: -3  Presentation: Vertex     Significant Labs:  No results found for: GROUPTRH, HEPBSAG, RUBELLAIGGSC, STREPBCULT, AFP, [id number]    I have personallly reviewed all pertinent lab results from the last 24 hours.    Assessment/Plan:     23 y.o. female  at 39w1d for:    Indication for care in labor or delivery    - Consents signed and to chart  - Admit to Labor and Delivery unit  - Plan for induction is cervdil  - Epidural per Anesthesia  - Draw CBC, T&S  - Notify Staff  - Ultrasound performed, fetus in vertex position.   - Recheck in 12 hrs or PRN  - Post-Partum Hemorrhage risk - low                  Shae Blank MD  Obstetrics  [facility]    Admit for induction  I have reviewed the resident's note, and agree with the diagnosis and management plan.

## 2018-06-05 LAB
BASOPHILS # BLD AUTO: 0.01 K/UL
BASOPHILS NFR BLD: 0.1 %
DIFFERENTIAL METHOD: ABNORMAL
EOSINOPHIL # BLD AUTO: 0.1 K/UL
EOSINOPHIL NFR BLD: 0.4 %
ERYTHROCYTE [DISTWIDTH] IN BLOOD BY AUTOMATED COUNT: 13.6 %
HCT VFR BLD AUTO: 30.6 %
HGB BLD-MCNC: 10 G/DL
LYMPHOCYTES # BLD AUTO: 2.7 K/UL
LYMPHOCYTES NFR BLD: 15.7 %
MCH RBC QN AUTO: 29.3 PG
MCHC RBC AUTO-ENTMCNC: 32.7 G/DL
MCV RBC AUTO: 90 FL
MONOCYTES # BLD AUTO: 0.9 K/UL
MONOCYTES NFR BLD: 5.5 %
NEUTROPHILS # BLD AUTO: 13.4 K/UL
NEUTROPHILS NFR BLD: 78 %
PLATELET # BLD AUTO: 221 K/UL
PMV BLD AUTO: 10.2 FL
RBC # BLD AUTO: 3.41 M/UL
WBC # BLD AUTO: 17.16 K/UL

## 2018-06-05 PROCEDURE — 85025 COMPLETE CBC W/AUTO DIFF WBC: CPT

## 2018-06-05 PROCEDURE — 72100003 HC LABOR CARE, EA. ADDL. 8 HRS

## 2018-06-05 PROCEDURE — 11000001 HC ACUTE MED/SURG PRIVATE ROOM

## 2018-06-05 PROCEDURE — 27000181 HC CABLE, IUPC

## 2018-06-05 PROCEDURE — 36415 COLL VENOUS BLD VENIPUNCTURE: CPT

## 2018-06-05 PROCEDURE — 25000003 PHARM REV CODE 250: Performed by: STUDENT IN AN ORGANIZED HEALTH CARE EDUCATION/TRAINING PROGRAM

## 2018-06-05 PROCEDURE — 99232 SBSQ HOSP IP/OBS MODERATE 35: CPT | Mod: ,,, | Performed by: OBSTETRICS & GYNECOLOGY

## 2018-06-05 RX ORDER — IBUPROFEN 600 MG/1
600 TABLET ORAL EVERY 6 HOURS
Qty: 30 TABLET | Refills: 1 | Status: SHIPPED | OUTPATIENT
Start: 2018-06-05

## 2018-06-05 RX ORDER — DOCUSATE SODIUM 100 MG/1
200 CAPSULE, LIQUID FILLED ORAL 2 TIMES DAILY PRN
Status: DISCONTINUED | OUTPATIENT
Start: 2018-06-05 | End: 2018-06-06 | Stop reason: HOSPADM

## 2018-06-05 RX ORDER — ACETAMINOPHEN 325 MG/1
650 TABLET ORAL EVERY 6 HOURS PRN
Status: DISCONTINUED | OUTPATIENT
Start: 2018-06-05 | End: 2018-06-06 | Stop reason: HOSPADM

## 2018-06-05 RX ORDER — DIPHENHYDRAMINE HCL 25 MG
25 CAPSULE ORAL EVERY 4 HOURS PRN
Status: DISCONTINUED | OUTPATIENT
Start: 2018-06-05 | End: 2018-06-06 | Stop reason: HOSPADM

## 2018-06-05 RX ORDER — ONDANSETRON 8 MG/1
8 TABLET, ORALLY DISINTEGRATING ORAL EVERY 8 HOURS PRN
Status: DISCONTINUED | OUTPATIENT
Start: 2018-06-05 | End: 2018-06-06 | Stop reason: HOSPADM

## 2018-06-05 RX ORDER — HYDROCORTISONE 25 MG/G
CREAM TOPICAL 3 TIMES DAILY PRN
Status: DISCONTINUED | OUTPATIENT
Start: 2018-06-05 | End: 2018-06-06 | Stop reason: HOSPADM

## 2018-06-05 RX ORDER — DIPHENHYDRAMINE HYDROCHLORIDE 50 MG/ML
25 INJECTION INTRAMUSCULAR; INTRAVENOUS EVERY 4 HOURS PRN
Status: DISCONTINUED | OUTPATIENT
Start: 2018-06-05 | End: 2018-06-06 | Stop reason: HOSPADM

## 2018-06-05 RX ADMIN — IBUPROFEN 600 MG: 600 TABLET, FILM COATED ORAL at 05:06

## 2018-06-05 RX ADMIN — IBUPROFEN 600 MG: 600 TABLET, FILM COATED ORAL at 06:06

## 2018-06-05 RX ADMIN — MISOPROSTOL 200 MCG: 200 TABLET ORAL at 06:06

## 2018-06-05 RX ADMIN — IBUPROFEN 600 MG: 600 TABLET, FILM COATED ORAL at 11:06

## 2018-06-05 RX ADMIN — MISOPROSTOL 200 MCG: 200 TABLET ORAL at 05:06

## 2018-06-05 RX ADMIN — MISOPROSTOL 200 MCG: 200 TABLET ORAL at 11:06

## 2018-06-05 NOTE — ASSESSMENT & PLAN NOTE
PPD #1  - Routine advances  - Diet: tolerating PO  - In/Out: voiding spontaneously  - Pain: controlled with PO meds  - Heme: Pre CBC: 13.4/11.6/35/237, PPD #1 CBC: 17.2/10/30.6/221;  cc  - Rh positive  - Lactation: consult PRN  - Contraception: to be addressed prior to discharge  - PPX: ambulation TID, PHIL/SCD  - Dispo: Anticipate discharge to home on PPD #2

## 2018-06-05 NOTE — ANESTHESIA POSTPROCEDURE EVALUATION
Anesthesia Post Evaluation    Patient: Marcela Real    Procedure(s) Performed: * No procedures listed *    Final Anesthesia Type: epidural  Patient location during evaluation: labor & delivery  Patient participation: Yes- Able to Participate  Level of consciousness: awake and alert and oriented  Post-procedure vital signs: reviewed and stable  Pain management: adequate  Airway patency: patent  PONV status at discharge: No PONV  Anesthetic complications: no      Cardiovascular status: blood pressure returned to baseline  Respiratory status: unassisted and spontaneous ventilation  Hydration status: euvolemic  Follow-up not needed.        Visit Vitals  /67   Pulse 68   Temp 37 °C (98.6 °F) (Oral)   Resp 18   Ht 5' (1.524 m)   Wt 81.3 kg (179 lb 3.7 oz)   SpO2 99%   Breastfeeding? Yes   BMI 35.00 kg/m²       Pain/Dana Score: Presence of Pain: complains of pain/discomfort (6/5/2018  5:26 AM)  Pain Rating Prior to Med Admin: 0 (6/5/2018 11:54 AM)  Pain Rating Post Med Admin: 6 (6/5/2018  6:15 AM)

## 2018-06-05 NOTE — L&D DELIVERY NOTE
[facility]  Vaginal Delivery   Operative Note    SUMMARY     Normal spontaneous vaginal delivery of live infant, was placed on mothers abdomen for skin to skin and bulb suctioning performed.  Infant delivered position OA over intact perineum.  Nuchal cord: No.  NICU evaluated infant while infant was on mother's abdomen and felt infant was stable to stay with mother.      Placenta delivered with gentle traction and IV pitocin was given noting good uterine tone.   No lacerations noted.  Patient tolerated delivery well. Sponge needle and lap counted correctly x2.      Ashley Damon MD  Ob/Gyn PGY-2      Indications: <principal problem not specified>  Pregnancy complicated by:   Patient Active Problem List   Diagnosis    Fetal tumor    Indication for care in labor or delivery     (spontaneous vaginal delivery)     Admitting GA: 39w2d    Delivery Information for  Staci Real    Birth information:  YOB: 2020   Time of birth: 8:38 PM   Sex: female   Head Delivery Date/Time: 2018  8:38 PM   Delivery type: Vaginal, Spontaneous Delivery   Gestational Age: 39w2d    Delivery Providers    Delivering clinician:  Dodie Stallworth DO   Provider Role    Ashley Damon MD Resident    Katharina Urias, RN Registered Nurse    Marita Campos, RN Registered Nurse    Kateryna Rene, RN Registered Nurse            [place] Measurements    Weight:  3120 g         [place] Assessment    Living status:  Living  Apgars:     1 Minute:   5 Minute:   10 Minute:   15 Minute:   20 Minute:     Skin Color:   0  1       Heart Rate:   2  2       Reflex Irritability:   2  2       Muscle Tone:   2  2       Respiratory Effort:   2  2       Total:   8  9               Apgars Assigned By:  NICU         Assisted Delivery Details:    Forceps attempted?:  No  Vacuum extractor attempted?:  No         Shoulder Dystocia    Shoulder dystocia present?:  No           Presentation and Position     Presentation:  Vertex  Position:  Anterior           Interventions/Resuscitation    Method:  NICU Attended       Cord    Vessels:  3 vessels  Complications:  None  Delayed Cord Clamping?:  Yes  Cord Clamped Date/Time:  2018  8:40 PM  Cord Blood Disposition:  Sent with Baby  Gases Sent?:  Yes  Stem Cell Collection (by MD):  No       Placenta    Date and time:  2018  8:38 PM  Removal:  Spontaneous  Appearance:  Intact  Placenta disposition:  discarded           Labor Events:       labor: No     Labor Onset Date/Time:         Dilation Complete Date/Time: 2018 20:00     Start Pushing Date/Time: 2018 20:36     Rupture Date/Time:              Rupture type:           Fluid Amount:        Fluid Color:        Fluid Odor:        Membrane Status (PeriCalm): ARM (Artificial Rupture)      Rupture Date/Time (PeriCalm): 2018 10:00:00      Fluid Amount (PeriCalm): Moderate      Fluid Color (PeriCalm): Clear       steroids: None     Antibiotics given for GBS: No     Induction: dinoprostone insert     Indications for induction:  Fetal Abnormality     Augmentation: oxytocin     Indications for augmentation: Ineffective Contraction Pattern     Labor complications: None     Additional complications:          Cervical ripening:                     Delivery:      Episiotomy: None     Indication for Episiotomy:       Perineal Lacerations: None Repaired:      Periurethral Laceration: none Repaired:     Labial Laceration: none Repaired:     Sulcus Laceration: none Repaired:     Vaginal Laceration: No Repaired:     Cervical Laceration: No Repaired:     Repair suture: None     Repair # of packets:       Vaginal delivery QBL (mL): 250      QBL (mL): 0     Combined Blood Loss (mL): 250     Vaginal Sweep Performed: Yes     Surgicount Correct: Yes       Other providers:       Anesthesia    Method:  Epidural          Details (if applicable):  Trial of Labor      Categorization:       Priority:     Indications for :     Incision Type:       Additional  information:  Forceps:    Vacuum:    Breech:    Observed anomalies    Other (Comments):

## 2018-06-05 NOTE — PROGRESS NOTES
[facility]  Obstetrics  Postpartum Progress Note    Patient Name: Marcela Real  MRN: [id number]  Admission Date: 6/3/2018  Hospital Length of Stay: 2 days  Attending Physician: Joe Mclean MD  Primary Care Provider: Primary Doctor No    Subjective:     Principal Problem:Indication for care in labor or delivery    Hospital course: 2018 Patient admitted for scheduled IOL 2/2 fetal abd mass. Cervidil induction.   2018 PPD #1 s/p uncomplicated .  cc. No issues overnight. Bleeding is mild. Pain well controlled. Tolerating PO. Voiding spontaneously.     Interval History:    PPD #1 s/p uncomplicated .  cc. No issues overnight. Bleeding is mild. Pain well controlled. Tolerating PO. Voiding spontaneously.    She is doing well this morning. She is tolerating a regular diet without nausea or vomiting. She is voiding spontaneously. She is ambulating. She has passed flatus, and has not a BM. Vaginal bleeding is mild. She denies fever or chills. Abdominal pain is mild and controlled with oral medications. She is breastfeeding.     Objective:     Vital Signs (Most Recent):  Temp: 98.3 °F (36.8 °C) (18)  Pulse: 80 (18)  Resp: 18 (18)  BP: 125/60 (18)  SpO2: 97 % (18) Vital Signs (24h Range):  Temp:  [98 °F (36.7 °C)-98.9 °F (37.2 °C)] 98.3 °F (36.8 °C)  Pulse:  [67-98] 80  Resp:  [18] 18  SpO2:  [94 %-100 %] 97 %  BP: ()/(51-83) 125/60     Weight: 81.3 kg (179 lb 3.7 oz)  There is no height or weight on file to calculate BMI.      Intake/Output Summary (Last 24 hours) at 18 0724  Last data filed at 18 0600   Gross per 24 hour   Intake                0 ml   Output             2425 ml   Net            -2425 ml       Significant Labs:  Lab Results   Component Value Date    GROUPTRH O POS 2018       Recent Labs  Lab 18  0514   HGB 10.0*   HCT 30.6*       Recent Labs  Lab 18  220  18  0514   WBC 13.44* 17.16*   HGB 11.6* 10.0*   HCT 35.0* 30.6*   MCV 90 90    221      I have personallly reviewed all pertinent lab results from the last 24 hours.    Physical Exam:   Constitutional: She is oriented to person, place, and time. She appears well-developed and well-nourished.    HENT:   Head: Normocephalic and atraumatic.    Eyes: Conjunctivae and EOM are normal. Pupils are equal, round, and reactive to light.    Neck: Normal range of motion. Neck supple.    Cardiovascular: Normal rate, regular rhythm and normal heart sounds.     Pulmonary/Chest: Effort normal and breath sounds normal. No respiratory distress.        Abdominal: Soft. Bowel sounds are normal. She exhibits distension. There is no tenderness. There is no rebound and no guarding.   Fundus firm below umbilicus      Genitourinary:   Genitourinary Comments: Deferred, bleeding minimal per patient           Musculoskeletal: Normal range of motion.       Neurological: She is alert and oriented to person, place, and time.    Skin: Skin is warm and dry. No rash noted.    Psychiatric: She has a normal mood and affect. Her behavior is normal. Judgment and thought content normal.       Assessment/Plan:     23 y.o. female  for:    * Indication for care in labor or delivery    - now s/p  2018           (spontaneous vaginal delivery)    PPD #1  - Routine advances  - Diet: tolerating PO  - In/Out: voiding spontaneously  - Pain: controlled with PO meds  - Heme: Pre CBC: 13.4/11.6/35/237, PPD #1 CBC: 17.2/10/30.6/221;  cc  - Rh positive  - Lactation: consult PRN  - Contraception: to be addressed prior to discharge  - PPX: ambulation TID, PHIL/SCD  - Dispo: Anticipate discharge to home on PPD #2           Disposition: As patient meets milestones, will plan to discharge PPD #2.    Francy Grant MD  Obstetrics  [facility]

## 2018-06-05 NOTE — LACTATION NOTE
06/05/18 1000   Maternal Infant Assessment   Breast Shape Bilateral:;round   Breast Density Bilateral:;soft   Areola Bilateral:;elastic   Nipple(s) Bilateral:;everted   Infant Assessment   Frenulum tight   Rooting Reflex present   LATCH Score   Latch 1-->repeated attempts, holds nipple in mouth, stimulate to suck   Audible Swallowing 0-->none   Type Of Nipple 2-->everted (after stimulation)   Comfort (Breast/Nipple) 2-->soft/nontender   Hold (Positioning) 0-->full assist (staff holds infant at breast)   Score (less than 7 for 2/more consecutive times, consult Lactation Consultant) 5   Maternal Infant Feeding   Infant Positioning cross-cradle   Time Spent (min) 15-30 min   Latch Assistance yes   Breastfeeding Education adequate infant intake;adequate milk volume;importance of skin-to-skin contact;increasing milk supply;milk expression, hand   Infant First Feeding   Skin-to-Skin Contact Maintained   Feeding Infant   Feeding Tolerance/Success disinterested   Lactation Referrals   Lactation Consult Initial assessment   Lactation Interventions   Attachment Promotion breastfeeding assistance provided;counseling provided;skin-to-skin contact encouraged   Breastfeeding Assistance assisted with positioning;feeding cue recognition promoted;milk expression/pumping   Maternal Breastfeeding Support diary/feeding log utilized;encouragement offered;maternal rest encouraged

## 2018-06-05 NOTE — DISCHARGE INSTRUCTIONS
Breastfeeding Discharge Instructions       Feed the baby at the earliest sign of hunger or comfort  o Hands to mouth, sucking motions  o Rooting or searching for something to suck on  o Dont wait for crying - it is a sign of distress     The feedings may be 8-12 times per 24hrs and will not follow a schedule   Avoid pacifiers and bottles for the first 4 weeks   Alternate the breast you start the feeding with, or start with the breast that feels the fullest   Switch breasts when the baby takes himself off the breast or falls asleep   Keep offering breasts until the baby looks full, no longer gives hunger signs, and stays asleep when placed on his back in the crib   If the baby is sleepy and wont wake for a feeding, put the baby skin-to-skin dressed in a diaper against the mothers bare chest   Sleep near your baby   The baby should be positioned and latched on to the breast correctly  o Chest-to-chest, chin in the breast  o Babys lips are flipped outward  o Babys mouth is stretched open wide like a shout  o Babys sucking should feel like tugging to the mother  - The baby should be drinking at the breast:  o You should hear swallowing or gulping throughout the feeding  o You should see milk on the babys lips when he comes off the breast  o Your breasts should be softer when the baby is finished feeding  o The baby should look relaxed at the end of feedings  o After the 4th day and your milk is in:  o The babys poop should turn bright yellow and be loose, watery, and seedy  o The baby should have at least 3-4 poops the size of the palm of your hand per day  o The baby should have at least 5-6 wet diapers per day  o The urine should be light yellow in color  You should drink when you are thirsty and eat a healthy diet when you are    hungry.     Take naps to get the rest you need.   Take medications and/or drink alcohol only with permission of your obstetrician    or the babys pediatrician.  You can  also call the [facility] Risk Center,   ([phone]), Monday-Friday, 8am-5pm Central time, to get the most   up-to-date evidence-based information on the use of medications during   pregnancy and breastfeeding.      The baby should be examined by a pediatrician at 3-5 days of age.  Once your   milk comes in, the baby should be gaining at least ½ - 1oz each day and should be back to birthweight no later than 10-14 days of age.          Community Resources    [facility] Breastfeeding Warmline: [phone]   Local [facility] clinics: provide incentives and breastpumps to eligible mothers  [employer] ([employer]):  mother-to-mother support group website        www.Newsrepsl.Flipswap  Local [employer] Leche League mother-to-mother support groups:        www.MacroGenics        [facility] Leche [employer] [facility]   Dr. Kaushal Mckeon website for latch videos and general information:        www.breastfeedinginc.ca  [facility] Risk [facility] is a call center that provides information about the safety of taking medications while breastfeeding.  Call 1-320.917.4395, M-F, 8am-5pm, [place].  International Lactation Consultant Association provides resources for assistance:        www.ilca.org  [place] Breastfeeding Coalition provides informationand resources for parents  and the community    www.LaBreastfeedingSupport.org     Gabriella Steel is a mom-to-mom support group:                             www.nolanesting.Nogacom//breastfeedng-support/  Part[employer] for Healthy Babies:  0-012-353-BABY(9756)  C[employer]ait: a breastfeeding support group for women of color, 215.227.8567        Preparation and Hygiene:    1. Shower daily.  2. Wear a clean bra each day and wash daily in warm soapy water.  3. Change wet or moist breast pads frequently.  Moist pads can promote growth of germs.  4. Actively wash your hands, paying close attention to the area around and under your fingernails, thoroughly with soap and water for 15 seconds before pumping or handling your  milk.  Re-wash your hands if you touch anything (scratching your nose, answering the phone, etc) while pumping or handling your milk.   5. Before pumping your breasts, assemble the pump collection kit and have ready the sterile container and labels.  Place these items on a clean surface next to the breastpump.  6. Each time after you have finished pumping, take apart all of the parts of the breastpump collection kit and place them in a separate cleaning container (do not place them in the sink).  Be sure to remove the yellow valve from the breastshield and separate the white membrane from the yellow valve.  Rinse all of these parts with cool water.  Then use a new sponge and/or bottle brush and dishwashing detergent to clean the parts.  Rinse off the soapy water with cool water and air dry on a clean towel covered with a clean cloth.  All parts may also be washed after each use in the top rack of a .  7. Once each day, sterilize all of the parts of the breastpump collection kit.  This can be done by boiling the kit parts for 10 minutes or by using a Quick Clean Micro-Steam Bag made by [employer]  8. If condensation appears in the tubing, continue to run the pump with the tubing attached for 1-2 minutes or until the tubing is dry.   9. Notify your babys nurse or doctor if you become ill or need to take any medication, even over-the-counter medicines.        Collection and Storage of Expressed Breastmilk:         1. Pump your breasts at least 8-10 times every 24 hours.  Double pump (both breasts at  the same time) for at least 15-20 minutes using the most suction that is comfortable.    2. Write the date and time of pumping and the name of any medications you are takingon the babys pre-printed hospital identification label.   3. Place your babys pre-printed hospital identification label on each container of breastmilk.  Additional pre-printed labels can be obtained from your babys nurse.  If your  expressed breastmilk is not correctly labeled, the nurse cannot feed the milk to the baby.       4. Place a brightly colored sticker on the top of each container of milk pumped during the first 30 days.  This identifies the milk as special and having higher levels of nutrients and anti-infective properties that are so important for your baby.  Additional stickers can be obtained from the lactation consultants or your babys nurse.  5.   Do not touch the inside of the storage containers or lids.  6.      Pour the amount of expressed milk needed for 1 of your babys feedings into each   storage container. Use a new container(s) for each pumping.  Additional storage   containers can be obtained from your babys nurse.        7.       Tightly screw the lid onto the container and place immediately into the       refrigerator fordaily transportation to the hospital.   Do not freeze your milk      unless asked to do so by your babys nurse.  However, if you are not able to      visit your baby each day, place the expressed breastmilk in the freezer.  8.   Expressed breastmilk should be refrigerated or frozen within 1 hour of      pumping.  9.      Do not store expressed breastmilk on the door of your refrigerator or freezer where the temperature is warmer.         Transportation of Expressed Breastmilk:    1. Refrigerated breastmilk or frozen milk should be packed tightly together in a cooler with frozen, blue gel-packs to keep the milk frozen.  DO NOT USE ICE CUBES (WET ICE) TO TRANSPORT FROZEN MILK.   A clean towel can be used to fill any extra space between containers of frozen milk.  2.    Bring your expressed milk from home each time you visit the baby.

## 2018-06-05 NOTE — PLAN OF CARE
Problem: Patient Care Overview  Goal: Plan of Care Review  Outcome: Ongoing (interventions implemented as appropriate)  Lactation note:  Lactation consultant's first visit with patient. Reviewed the breastfeeding guide and encouraged the patient to feed infant 8 or more times in 24 hours on cue until content. Infant unable to latch this feeding; showed mom how to perform hand expression and spoonfed infant colostrum. Offered to assist with breastfeeding at next feeding. Did discuss possibly needing breast pump and mom states she will regardless due to infant having surgery Thursday or Friday.  Left  phone number on board for patient to call for assistance.

## 2018-06-05 NOTE — SUBJECTIVE & OBJECTIVE
Hospital course: 2018 Patient admitted for scheduled IOL 2/2 fetal abd mass. Cervidil induction.   2018 PPD #1 s/p uncomplicated .  cc. No issues overnight. Bleeding is mild. Pain well controlled. Tolerating PO. Voiding spontaneously.     Interval History:    PPD #1 s/p uncomplicated .  cc. No issues overnight. Bleeding is mild. Pain well controlled. Tolerating PO. Voiding spontaneously.    She is doing well this morning. She is tolerating a regular diet without nausea or vomiting. She is voiding spontaneously. She is ambulating. She has passed flatus, and has not a BM. Vaginal bleeding is mild. She denies fever or chills. Abdominal pain is mild and controlled with oral medications. She is breastfeeding.     Objective:     Vital Signs (Most Recent):  Temp: 98.3 °F (36.8 °C) (18)  Pulse: 80 (18)  Resp: 18 (18)  BP: 125/60 (18)  SpO2: 97 % (18) Vital Signs (24h Range):  Temp:  [98 °F (36.7 °C)-98.9 °F (37.2 °C)] 98.3 °F (36.8 °C)  Pulse:  [67-98] 80  Resp:  [18] 18  SpO2:  [94 %-100 %] 97 %  BP: ()/(51-83) 125/60     Weight: 81.3 kg (179 lb 3.7 oz)  There is no height or weight on file to calculate BMI.      Intake/Output Summary (Last 24 hours) at 18 0724  Last data filed at 18 0600   Gross per 24 hour   Intake                0 ml   Output             2425 ml   Net            -2425 ml       Significant Labs:  Lab Results   Component Value Date    GROUPTRH O POS 2018       Recent Labs  Lab 18   HGB 10.0*   HCT 30.6*       Recent Labs  Lab 18  22018   WBC 13.44* 17.16*   HGB 11.6* 10.0*   HCT 35.0* 30.6*   MCV 90 90    221      I have personallly reviewed all pertinent lab results from the last 24 hours.    Physical Exam:   Constitutional: She is oriented to person, place, and time. She appears well-developed and well-nourished.    HENT:   Head: Normocephalic and  atraumatic.    Eyes: Conjunctivae and EOM are normal. Pupils are equal, round, and reactive to light.    Neck: Normal range of motion. Neck supple.    Cardiovascular: Normal rate, regular rhythm and normal heart sounds.     Pulmonary/Chest: Effort normal and breath sounds normal. No respiratory distress.        Abdominal: Soft. Bowel sounds are normal. She exhibits distension. There is no tenderness. There is no rebound and no guarding.   Fundus firm below umbilicus      Genitourinary:   Genitourinary Comments: Deferred, bleeding minimal per patient           Musculoskeletal: Normal range of motion.       Neurological: She is alert and oriented to person, place, and time.    Skin: Skin is warm and dry. No rash noted.    Psychiatric: She has a normal mood and affect. Her behavior is normal. Judgment and thought content normal.

## 2018-06-05 NOTE — PLAN OF CARE
Problem: Patient Care Overview  Goal: Plan of Care Review  Outcome: Ongoing (interventions implemented as appropriate)  VSS. Pain well controlled with oral medications. Pt ambulating and voiding independently. Pt safety maintained. Significant other at bedside providing support. Spectralink number placed on dry erase board. Pt instructed to call RN for any concerns.    Problem: Breastfeeding (Adult,Obstetrics,Pediatric)  Goal: Signs and Symptoms of Listed Potential Problems Will be Absent, Minimized or Managed (Breastfeeding)  Signs and symptoms of listed potential problems will be absent, minimized or managed by discharge/transition of care (reference Breastfeeding (Adult,Obstetrics,Pediatric) CPG).   Outcome: Ongoing (interventions implemented as appropriate)  Infant disinterested in feeding. Pt pumping and hand expressing for feeds.    Problem: Postpartum (Vaginal Delivery) (Adult,Obstetrics,Pediatric)  Goal: Signs and Symptoms of Listed Potential Problems Will be Absent, Minimized or Managed (Postpartum)  Signs and symptoms of listed potential problems will be absent, minimized or managed by discharge/transition of care (reference Postpartum (Vaginal Delivery) (Adult,Obstetrics,Pediatric) CPG).   Outcome: Ongoing (interventions implemented as appropriate)  Lochia light. Fundus firm and midline. Pt ambulating and voiding spontaneously.

## 2018-06-06 VITALS
TEMPERATURE: 99 F | BODY MASS INDEX: 35.19 KG/M2 | HEART RATE: 73 BPM | RESPIRATION RATE: 18 BRPM | WEIGHT: 179.25 LBS | DIASTOLIC BLOOD PRESSURE: 65 MMHG | OXYGEN SATURATION: 98 % | SYSTOLIC BLOOD PRESSURE: 113 MMHG | HEIGHT: 60 IN

## 2018-06-06 PROCEDURE — 90471 IMMUNIZATION ADMIN: CPT | Performed by: STUDENT IN AN ORGANIZED HEALTH CARE EDUCATION/TRAINING PROGRAM

## 2018-06-06 PROCEDURE — 25000003 PHARM REV CODE 250: Performed by: STUDENT IN AN ORGANIZED HEALTH CARE EDUCATION/TRAINING PROGRAM

## 2018-06-06 PROCEDURE — 99238 HOSP IP/OBS DSCHRG MGMT 30/<: CPT | Mod: ,,, | Performed by: OBSTETRICS & GYNECOLOGY

## 2018-06-06 PROCEDURE — 90715 TDAP VACCINE 7 YRS/> IM: CPT | Performed by: STUDENT IN AN ORGANIZED HEALTH CARE EDUCATION/TRAINING PROGRAM

## 2018-06-06 PROCEDURE — 63600175 PHARM REV CODE 636 W HCPCS: Performed by: STUDENT IN AN ORGANIZED HEALTH CARE EDUCATION/TRAINING PROGRAM

## 2018-06-06 RX ADMIN — IBUPROFEN 600 MG: 600 TABLET, FILM COATED ORAL at 12:06

## 2018-06-06 RX ADMIN — IBUPROFEN 600 MG: 600 TABLET, FILM COATED ORAL at 06:06

## 2018-06-06 RX ADMIN — CLOSTRIDIUM TETANI TOXOID ANTIGEN (FORMALDEHYDE INACTIVATED), CORYNEBACTERIUM DIPHTHERIAE TOXOID ANTIGEN (FORMALDEHYDE INACTIVATED), BORDETELLA PERTUSSIS TOXOID ANTIGEN (GLUTARALDEHYDE INACTIVATED), BORDETELLA PERTUSSIS FILAMENTOUS HEMAGGLUTININ ANTIGEN (FORMALDEHYDE INACTIVATED), BORDETELLA PERTUSSIS PERTACTIN ANTIGEN, AND BORDETELLA PERTUSSIS FIMBRIAE 2/3 ANTIGEN 0.5 ML: 5; 2; 2.5; 5; 3; 5 INJECTION, SUSPENSION INTRAMUSCULAR at 04:06

## 2018-06-06 NOTE — SUBJECTIVE & OBJECTIVE
Hospital course: 2018 Patient admitted for scheduled IOL 2/2 fetal abd mass. Cervidil induction.   2018 PPD #1 s/p uncomplicated .  cc. No issues overnight. Bleeding is mild. Pain well controlled. Tolerating PO. Voiding spontaneously.   2018 PPD#2 Doing well. Meeting all post partum milestones. Medically stable for discharge to home today. Discharge instructions discussed. Routine follow up in 6 weeks.     Interval History:   PPD#2 Doing well. Medically stable for discharge to home today.     She is doing well this morning. She is tolerating a regular diet without nausea or vomiting. She is voiding spontaneously. She is ambulating. She has passed flatus, and has a BM. Vaginal bleeding is mild. She denies fever or chills. Abdominal pain is mild and controlled with oral medications. She is breastfeeding.    Objective:     Vital Signs (Most Recent):  Temp: 98.9 °F (37.2 °C) (18 0800)  Pulse: 64 (18 0800)  Resp: 18 (18 0800)  BP: 117/70 (18 0800)  SpO2: 100 % (18 0800) Vital Signs (24h Range):  Temp:  [98.9 °F (37.2 °C)-99.2 °F (37.3 °C)] 98.9 °F (37.2 °C)  Pulse:  [64-72] 64  Resp:  [16-18] 18  SpO2:  [96 %-100 %] 100 %  BP: (105-117)/(64-74) 117/70     Weight: 81.3 kg (179 lb 3.7 oz)  Body mass index is 35 kg/m².    No intake or output data in the 24 hours ending 18 0837    Significant Labs:  Lab Results   Component Value Date    GROUPTRH O POS 2018       Recent Labs  Lab 18  0514   HGB 10.0*   HCT 30.6*       Recent Labs  Lab 18  0514   WBC 13.44* 17.16*   HGB 11.6* 10.0*   HCT 35.0* 30.6*   MCV 90 90    221      I have personallly reviewed all pertinent lab results from the last 24 hours.    Physical Exam:   Constitutional: She is oriented to person, place, and time. She appears well-developed and well-nourished.    HENT:   Head: Normocephalic and atraumatic.    Eyes: Conjunctivae and EOM are normal. Pupils are  equal, round, and reactive to light.    Neck: Normal range of motion. Neck supple.    Cardiovascular: Normal rate, regular rhythm and normal heart sounds.     Pulmonary/Chest: Effort normal and breath sounds normal. No respiratory distress.        Abdominal: Soft. Bowel sounds are normal. She exhibits distension. There is no tenderness. There is no rebound and no guarding.   Fundus firm below umbilicus      Genitourinary:   Genitourinary Comments: Deferred, bleeding minimal per patient           Musculoskeletal: Normal range of motion.       Neurological: She is alert and oriented to person, place, and time.    Skin: Skin is warm and dry. No rash noted.    Psychiatric: She has a normal mood and affect. Her behavior is normal. Judgment and thought content normal.

## 2018-06-06 NOTE — LACTATION NOTE
06/06/18 0910   Maternal Infant Assessment   Nipple Symptoms tender  (while using breastpump)   Pain/Comfort Assessments   Pain Assessment Performed Yes       Number Scale   Presence of Pain complains of pain/discomfort   Location - Side Bilateral   Location nipple(s)   Factors that Relieve Pain repositioning  (decrease breastpump suction to comfort)   Maternal Infant Feeding   Time Spent (min) 15-30 min   Lactation Interventions   Attachment Promotion counseling provided;family involvement promoted;role responsibility promoted;skin-to-skin contact encouraged   Breast Care: Breastfeeding lanolin to nipple(s) applied   Breastfeeding Assistance feeding cue recognition promoted;milk expression/pumping;support offered   Maternal Breastfeeding Support diary/feeding log utilized;encouragement offered;lactation counseling provided;maternal hydration promoted;maternal nutrition promoted;maternal rest encouraged   Patient expressed desire to pump and bottlefeed baby breastmilk; praised; requested she call lactation for assistance with use of breastpump and hand expression; provided lactation discharge education; reviewed Mother's Breastfeeding Guide; advised on imagery, relaxation and breastpumping techniques to facilitate milk transfer, rental vs retail breastpumps; provided manual breastpump; demonstrated assembly; patient and her partner able to return demonstration;

## 2018-06-06 NOTE — DISCHARGE SUMMARY
[facility]  Obstetrics  Discharge Summary      Patient Name: Marcela Real  MRN: [id number]  Admission Date: 6/3/2018  Hospital Length of Stay: 3 days  Discharge Date and Time:  2018 6:55 AM  Attending Physician: Joe Mclean MD   Discharging Provider: Ashley Damon MD  Primary Care Provider: Primary Doctor No    HPI:  Marcela Real is a 23 y.o.  female with IUP at 39w1d weeks gestation who is admitted for an induction of labor secondary to fetal abdominal mass.     This IUP is complicated by fetal abdominal mass and h/o placental abruption.  Patient denies contractions, denies vaginal bleeding, denies LOF.   Fetal Movement: normal.       * No surgery found *     Hospital Course:   2018 Patient admitted for scheduled IOL 2/2 fetal abd mass. Cervidil induction.   2018 PPD #1 s/p uncomplicated .  cc. No issues overnight. Bleeding is mild. Pain well controlled. Tolerating PO. Voiding spontaneously.         Final Active Diagnoses:    Diagnosis Date Noted POA    PRINCIPAL PROBLEM:  Indication for care in labor or delivery [O75.9] 2018 Yes     (spontaneous vaginal delivery) [O80] 2018 Not Applicable      Problems Resolved During this Admission:    Diagnosis Date Noted Date Resolved POA        Labs:     Recent Labs  Lab 18  2201 18  0514   WBC 13.44* 17.16*   HGB 11.6* 10.0*   HCT 35.0* 30.6*   MCV 90 90    221        Feeding Method: breast    Immunizations     Date Immunization Status Dose Route/Site Given by    18 0455 MMR Incomplete 0.5 mL Subcutaneous/Left deltoid     18 0455 Tdap Incomplete 0.5 mL Intramuscular/Left deltoid           Delivery:    Episiotomy: None   Lacerations: None   Repair suture: None   Repair # of packets:     Blood loss (ml): 250     Birth information:  YOB: 2020   Time of birth: 8:38 PM   Sex: female   Delivery type: Vaginal, Spontaneous Delivery   Gestational  Age: 39w2d    Delivery Clinician:      Other providers:       Additional  information:  Forceps:    Vacuum:    Breech:    Observed anomalies      Living?:           APGARS  One minute Five minutes Ten minutes   Skin color:         Heart rate:         Grimace:         Muscle tone:         Breathing:         Totals: 8  9        Placenta: Delivered:       appearance    Pending Diagnostic Studies:     None          Discharged Condition: good    Disposition: Home or Self Care    Follow Up:  Follow-up Information     Michelle Marina MD In 6 weeks.    Specialty:  Obstetrics and Gynecology  Why:  Post-partum check-up  Contact information:  [address] [place] [id number]  [phone]                 Patient Instructions:     Activity as tolerated     Notify your health care provider if you experience any of the following:  increased confusion or weakness     Notify your health care provider if you experience any of the following:  persistent dizziness, light-headedness, or visual disturbances     Notify your health care provider if you experience any of the following:  worsening rash     Notify your health care provider if you experience any of the following:  severe persistent headache     Notify your health care provider if you experience any of the following:  difficulty breathing or increased cough     Notify your health care provider if you experience any of the following:  severe uncontrolled pain     Notify your health care provider if you experience any of the following:  temperature >100.4     Notify your health care provider if you experience any of the following:  persistent nausea and vomiting or diarrhea     Notify your health care provider if you experience any of the following:  redness, tenderness, or signs of infection (pain, swelling, redness, odor or green/yellow discharge around incision site)       Medications:  Current Discharge Medication List      START taking these medications    Details    ibuprofen (ADVIL,MOTRIN) 600 MG tablet Take 1 tablet (600 mg total) by mouth every 6 (six) hours.  Qty: 30 tablet, Refills: 1         CONTINUE these medications which have NOT CHANGED    Details   prenatal vit,calc76/iron/folic (PNV 29-1 ORAL) Take by mouth.             Ashley Damon MD  Obstetrics  [facility]

## 2018-06-06 NOTE — PROGRESS NOTES
[facility]  Obstetrics  Postpartum Progress Note    Patient Name: Marcela Real  MRN: [id number]  Admission Date: 6/3/2018  Hospital Length of Stay: 3 days  Attending Physician: Joe Mclean MD  Primary Care Provider: Primary Doctor No    Subjective:     Principal Problem:Indication for care in labor or delivery    Hospital course: 2018 Patient admitted for scheduled IOL 2/2 fetal abd mass. Cervidil induction.   2018 PPD #1 s/p uncomplicated .  cc. No issues overnight. Bleeding is mild. Pain well controlled. Tolerating PO. Voiding spontaneously.   2018 PPD#2 Doing well. Meeting all post partum milestones. Medically stable for discharge to home today. Discharge instructions discussed. Routine follow up in 6 weeks.     Interval History:   PPD#2 Doing well. Medically stable for discharge to home today.     She is doing well this morning. She is tolerating a regular diet without nausea or vomiting. She is voiding spontaneously. She is ambulating. She has passed flatus, and has a BM. Vaginal bleeding is mild. She denies fever or chills. Abdominal pain is mild and controlled with oral medications. She is breastfeeding.    Objective:     Vital Signs (Most Recent):  Temp: 98.9 °F (37.2 °C) (18 0800)  Pulse: 64 (18 0800)  Resp: 18 (18 0800)  BP: 117/70 (18 0800)  SpO2: 100 % (18 0800) Vital Signs (24h Range):  Temp:  [98.9 °F (37.2 °C)-99.2 °F (37.3 °C)] 98.9 °F (37.2 °C)  Pulse:  [64-72] 64  Resp:  [16-18] 18  SpO2:  [96 %-100 %] 100 %  BP: (105-117)/(64-74) 117/70     Weight: 81.3 kg (179 lb 3.7 oz)  Body mass index is 35 kg/m².    No intake or output data in the 24 hours ending 18 0837    Significant Labs:  Lab Results   Component Value Date    GROUPTRH O POS 2018       Recent Labs  Lab 18  0514   HGB 10.0*   HCT 30.6*       Recent Labs  Lab 18  2201 18  0514   WBC 13.44* 17.16*   HGB 11.6* 10.0*   HCT  35.0* 30.6*   MCV 90 90    221      I have personallly reviewed all pertinent lab results from the last 24 hours.    Physical Exam:   Constitutional: She is oriented to person, place, and time. She appears well-developed and well-nourished.    HENT:   Head: Normocephalic and atraumatic.    Eyes: Conjunctivae and EOM are normal. Pupils are equal, round, and reactive to light.    Neck: Normal range of motion. Neck supple.    Cardiovascular: Normal rate, regular rhythm and normal heart sounds.     Pulmonary/Chest: Effort normal and breath sounds normal. No respiratory distress.        Abdominal: Soft. Bowel sounds are normal. She exhibits distension. There is no tenderness. There is no rebound and no guarding.   Fundus firm below umbilicus      Genitourinary:   Genitourinary Comments: Deferred, bleeding minimal per patient           Musculoskeletal: Normal range of motion.       Neurological: She is alert and oriented to person, place, and time.    Skin: Skin is warm and dry. No rash noted.    Psychiatric: She has a normal mood and affect. Her behavior is normal. Judgment and thought content normal.       Assessment/Plan:     23 y.o. female  for:    * Indication for care in labor or delivery    - now s/p  2018           (spontaneous vaginal delivery)    PPD #2  - Routine advances  - Diet: tolerating PO  - In/Out: voiding spontaneously  - Pain: controlled with PO meds  - Heme: Pre CBC: 13.4/11.6/35/237, PPD #1 CBC: 17.2/10/30.6/221;  cc  - Rh positive  - Lactation: consult PRN  - Contraception: to be addressed prior to discharge  - PPX: ambulation TID, PHIL/SCD  - Dispo: Patient medically stable for discharge to home on PPD #2. Routine follow up in  6 weeks. Discharge instructions discussed.            Disposition: As patient meets milestones, will plan to discharge today, PPD#2.    Francy Grant MD  Obstetrics  [facility]

## 2018-06-06 NOTE — NURSING
DC instructions given to patient, questions answered. No distress noted. Discharge home. [facility].

## 2018-06-06 NOTE — PLAN OF CARE
Problem: Patient Care Overview  Goal: Plan of Care Review  Outcome: Ongoing (interventions implemented as appropriate)  Patient will feed baby on cue until content at least 8 times in 24 hours observing for signs of milk transfer; she will wake baby prn; she will use breastpump on highest comfortable suction 8-10 times in 24 hours for 20-30 minutes per breast to establish and maintain her breastmilk supply and to provide supplement for her baby;

## 2018-06-06 NOTE — ASSESSMENT & PLAN NOTE
PPD #2  - Routine advances  - Diet: tolerating PO  - In/Out: voiding spontaneously  - Pain: controlled with PO meds  - Heme: Pre CBC: 13.4/11.6/35/237, PPD #1 CBC: 17.2/10/30.6/221;  cc  - Rh positive  - Lactation: consult PRN  - Contraception: to be addressed prior to discharge  - PPX: ambulation TID, PHIL/SCD  - Dispo: Patient medically stable for discharge to home on PPD #2. Routine follow up in  6 weeks. Discharge instructions discussed.

## 2019-10-17 ENCOUNTER — HOSPITAL ENCOUNTER (EMERGENCY)
Facility: HOSPITAL | Age: 24
Discharge: HOME | End: 2019-10-18
Attending: EMERGENCY MEDICINE
Payer: MEDICAID

## 2019-10-17 DIAGNOSIS — J01.00 ACUTE NON-RECURRENT MAXILLARY SINUSITIS: Primary | ICD-10-CM

## 2019-10-17 DIAGNOSIS — R05.9 COUGH: ICD-10-CM

## 2019-10-17 DIAGNOSIS — K05.10 GINGIVITIS: ICD-10-CM

## 2019-10-17 LAB
B-HCG UR QL: NEGATIVE
CTP QC/QA: YES

## 2019-10-17 PROCEDURE — 81025 URINE PREGNANCY TEST: CPT | Performed by: EMERGENCY MEDICINE

## 2019-10-17 PROCEDURE — 99284 EMERGENCY DEPT VISIT MOD MDM: CPT | Mod: 25

## 2019-10-17 RX ORDER — NAPROXEN 250 MG/1
500 TABLET ORAL
Status: COMPLETED | OUTPATIENT
Start: 2019-10-18 | End: 2019-10-18

## 2019-10-17 RX ORDER — AMOXICILLIN AND CLAVULANATE POTASSIUM 875; 125 MG/1; MG/1
1 TABLET, FILM COATED ORAL 2 TIMES DAILY
Qty: 14 TABLET | Refills: 0 | Status: SHIPPED | OUTPATIENT
Start: 2019-10-17

## 2019-10-18 VITALS
RESPIRATION RATE: 16 BRPM | HEART RATE: 94 BPM | SYSTOLIC BLOOD PRESSURE: 112 MMHG | BODY MASS INDEX: 32.81 KG/M2 | WEIGHT: 168 LBS | OXYGEN SATURATION: 98 % | DIASTOLIC BLOOD PRESSURE: 68 MMHG | TEMPERATURE: 98 F

## 2019-10-18 PROCEDURE — 25000003 PHARM REV CODE 250: Performed by: EMERGENCY MEDICINE

## 2019-10-18 RX ADMIN — NAPROXEN 500 MG: 250 TABLET ORAL at 12:10

## 2019-10-18 NOTE — ED PROVIDER NOTES
Encounter Date: 10/17/2019       History     Chief Complaint   Patient presents with    Nasal Congestion    Cough     24-year-old female presents complaining of nasal congestion and cough, patient reports she has had these symptoms for the past 2-3 days.  Patient reports that she smokes about half a pack a day.  Patient has no other acute symptoms        Review of patient's allergies indicates:  No Known Allergies  No past medical history on file.  No past surgical history on file.  No family history on file.  Social History     Tobacco Use    Smoking status: Never Smoker    Smokeless tobacco: Never Used   Substance Use Topics    Alcohol use: No    Drug use: Not on file     Review of Systems   Constitutional: Negative for fever.   HENT: Positive for congestion and dental problem. Negative for rhinorrhea, sore throat and trouble swallowing.    Eyes: Negative for visual disturbance.   Respiratory: Positive for cough. Negative for chest tightness, shortness of breath and wheezing.    Cardiovascular: Negative for chest pain, palpitations and leg swelling.   Gastrointestinal: Negative for abdominal distention, abdominal pain, constipation, diarrhea, nausea and vomiting.   Genitourinary: Negative for difficulty urinating, dysuria, flank pain and frequency.   Musculoskeletal: Negative for arthralgias, back pain, joint swelling and neck pain.   Skin: Negative for color change and rash.   Neurological: Negative for dizziness, syncope, speech difficulty, weakness, numbness and headaches.   All other systems reviewed and are negative.      Physical Exam     Initial Vitals [10/17/19 2136]   BP Pulse Resp Temp SpO2   125/70 95 16 98.2 °F (36.8 °C) 99 %      MAP       --         Physical Exam    Nursing note and vitals reviewed.  Constitutional: She appears well-developed and well-nourished. She is not diaphoretic. No distress.   HENT:   Head: Normocephalic and atraumatic.   Right Ear: External ear normal.   Left Ear:  External ear normal.   Nose: Nose normal.   Mouth/Throat: Oropharynx is clear and moist. No oropharyngeal exudate.   Gum irritation noted to the left maxilla, mild swelling, no obvious fluctuance or drainage   Eyes: Conjunctivae and EOM are normal. Pupils are equal, round, and reactive to light. Right eye exhibits no discharge. Left eye exhibits no discharge. No scleral icterus.   Neck: Normal range of motion. Neck supple. No thyromegaly present. No tracheal deviation present. No JVD present.   Cardiovascular: Normal rate, regular rhythm, normal heart sounds and intact distal pulses. Exam reveals no gallop and no friction rub.    No murmur heard.  Pulmonary/Chest: Breath sounds normal. No stridor. No respiratory distress. She has no wheezes. She has no rhonchi. She has no rales. She exhibits no tenderness.   Abdominal: Soft. Bowel sounds are normal. She exhibits no distension and no mass. There is no tenderness. There is no rebound and no guarding.   Musculoskeletal: Normal range of motion. She exhibits no edema or tenderness.   Lymphadenopathy:     She has no cervical adenopathy.   Neurological: She is alert and oriented to person, place, and time. She has normal strength. She displays normal reflexes. No cranial nerve deficit or sensory deficit.   Skin: Skin is warm and dry. No rash and no abscess noted. No erythema. No pallor.         ED Course   Procedures  Labs Reviewed   POCT URINE PREGNANCY - Abnormal; Notable for the following components:       Result Value    POC Preg Test, Ur Negative (*)     All other components within normal limits          Imaging Results          X-Ray Chest PA And Lateral (In process)                  Medical Decision Making:   History:   Old Medical Records: I decided to obtain old medical records.  Initial Assessment:   Emergent evaluation of a 24-year-old female presenting with nasal congestion and cough as well as dental pain, patient has no obvious abscess noted on dental exam  however she does have gingival irritation, patient also noted to have tenderness to palpation of the maxillary sinuses bilaterally consistent with acute sinusitis.  Patient will be started on a course of antibiotics and is to follow up with PCP and Dentistry in the next 2-3 days, patient cautioned to return immediately to the ER for any worsening or for any further concerns.                      Clinical Impression:       ICD-10-CM ICD-9-CM   1. Acute non-recurrent maxillary sinusitis J01.00 461.0   2. Cough R05 786.2   3. Gingivitis K05.10 523.10                                Yakov Gaxiola MD  10/18/19 0044

## 2019-12-19 ENCOUNTER — CLINICAL SUPPORT (OUTPATIENT)
Dept: URGENT CARE | Facility: CLINIC | Age: 24
End: 2019-12-19
Payer: MEDICAID

## 2019-12-19 VITALS
HEIGHT: 62 IN | RESPIRATION RATE: 18 BRPM | TEMPERATURE: 98 F | WEIGHT: 174 LBS | BODY MASS INDEX: 32.02 KG/M2 | DIASTOLIC BLOOD PRESSURE: 80 MMHG | OXYGEN SATURATION: 98 % | SYSTOLIC BLOOD PRESSURE: 144 MMHG | HEART RATE: 96 BPM

## 2019-12-19 DIAGNOSIS — J02.9 SORE THROAT: Primary | ICD-10-CM

## 2019-12-19 DIAGNOSIS — J02.0 STREP PHARYNGITIS: ICD-10-CM

## 2019-12-19 LAB
B-HCG UR QL: NEGATIVE
CTP QC/QA: YES
CTP QC/QA: YES
S PYO RRNA THROAT QL PROBE: POSITIVE

## 2019-12-19 PROCEDURE — 81025 POCT URINE PREGNANCY: ICD-10-PCS | Mod: S$GLB,,, | Performed by: NURSE PRACTITIONER

## 2019-12-19 PROCEDURE — 81025 URINE PREGNANCY TEST: CPT | Mod: S$GLB,,, | Performed by: NURSE PRACTITIONER

## 2019-12-19 PROCEDURE — 99204 OFFICE O/P NEW MOD 45 MIN: CPT | Mod: 25,S$GLB,, | Performed by: NURSE PRACTITIONER

## 2019-12-19 PROCEDURE — 99204 PR OFFICE/OUTPT VISIT, NEW, LEVL IV, 45-59 MIN: ICD-10-PCS | Mod: 25,S$GLB,, | Performed by: NURSE PRACTITIONER

## 2019-12-19 PROCEDURE — 87880 POCT RAPID STREP A: ICD-10-PCS | Mod: QW,,, | Performed by: NURSE PRACTITIONER

## 2019-12-19 PROCEDURE — 87880 STREP A ASSAY W/OPTIC: CPT | Mod: QW,,, | Performed by: NURSE PRACTITIONER

## 2019-12-19 RX ORDER — DEXAMETHASONE SODIUM PHOSPHATE 4 MG/ML
8 INJECTION, SOLUTION INTRA-ARTICULAR; INTRALESIONAL; INTRAMUSCULAR; INTRAVENOUS; SOFT TISSUE
Status: COMPLETED | OUTPATIENT
Start: 2019-12-19 | End: 2019-12-19

## 2019-12-19 RX ORDER — AMOXICILLIN 875 MG/1
875 TABLET, FILM COATED ORAL 2 TIMES DAILY
Qty: 20 TABLET | Refills: 0 | Status: SHIPPED | OUTPATIENT
Start: 2019-12-19 | End: 2019-12-29

## 2019-12-19 RX ADMIN — DEXAMETHASONE SODIUM PHOSPHATE 8 MG: 4 INJECTION, SOLUTION INTRA-ARTICULAR; INTRALESIONAL; INTRAMUSCULAR; INTRAVENOUS; SOFT TISSUE at 09:12

## 2019-12-19 NOTE — PATIENT INSTRUCTIONS
Pharyngitis: Strep (Confirmed)    You have had a positive test for strep throat. Strep throat is a contagious illness. It is spread by coughing, kissing or by touching others after touching your mouth or nose. Symptoms include throat pain that is worse with swallowing, aching all over, headache, and fever. It is treated with antibiotic medicine. This should help you start to feel better in 1 to 2 days.  Home care  · Rest at home. Drink plenty of fluids to you won't get dehydrated.  · No work or school for the first 2 days of taking the antibiotics. After this time, you will not be contagious. You can then return to school or work if you are feeling better.   · Take antibiotic medicine for the full 10 days, even if you feel better. This is very important to ensure the infection is treated. It is also important to prevent medicine-resistant germs from developing. If you were given an antibiotic shot, you don't need any more antibiotics.  · You may use acetaminophen or ibuprofen to control pain or fever, unless another medicine was prescribed for this. Talk with your doctor before taking these medicines if you have chronic liver or kidney disease. Also talk with your doctor if you have had a stomach ulcer or GI bleeding.  · Throat lozenges or sprays help reduce pain. Gargling with warm saltwater will also reduce throat pain. Dissolve 1/2 teaspoon of salt in 1 glass of warm water. This may be useful just before meals.   · Soft foods are OK. Avoid salty or spicy foods.  Follow-up care  Follow up with your healthcare provider or our staff if you don't get better over the next week.  When to seek medical advice  Call your healthcare provider right away if any of these occur:  · Fever of 100.4ºF (38ºC) or higher, or as directed by your healthcare provider  · New or worsening ear pain, sinus pain, or headache  · Painful lumps in the back of neck  · Stiff neck  · Lymph nodes getting larger or becoming soft in the  middle  · You can't swallow liquids or you can't open your mouth wide because of throat pain  · Signs of dehydration. These include very dark urine or no urine, sunken eyes, and dizziness.  · Trouble breathing or noisy breathing  · Muffled voice  · Rash  Date Last Reviewed: 4/13/2015  © 7067-6519 [employer]. [address] [place] [address], [place] [address]. All rights reserved. This information is not intended as a substitute for professional medical care. Always follow your healthcare professional's instructions.

## 2019-12-19 NOTE — PROGRESS NOTES
"Subjective:       Patient ID: Marcela Real is a 24 y.o. female.    Vitals:  height is 5' 2" (1.575 m) and weight is 78.9 kg (174 lb). Her oral temperature is 98.4 °F (36.9 °C). Her blood pressure is 144/80 (abnormal) and her pulse is 96. Her respiration is 18 and oxygen saturation is 98%.     Chief Complaint: Sore Throat    Pt presents with sore throat x 2 days. Pt states symptoms are gradually worsening and not improving. She describes symptoms as burning quality, worsens with swallowing. She is suzie po intake.     Sore Throat    This is a new problem. The current episode started yesterday. The problem has been gradually worsening. There has been no fever. Associated symptoms include swollen glands. Pertinent negatives include no congestion, coughing, ear pain, shortness of breath, stridor or vomiting. She has tried nothing for the symptoms. The treatment provided no relief.       Constitution: Negative for chills, sweating, fatigue and fever.   HENT: Positive for sore throat. Negative for ear pain, congestion, sinus pain, sinus pressure and voice change.    Neck: Negative for painful lymph nodes.   Eyes: Negative for eye redness.   Respiratory: Negative for chest tightness, cough, sputum production, bloody sputum, COPD, shortness of breath, stridor, wheezing and asthma.    Gastrointestinal: Negative for nausea and vomiting.   Musculoskeletal: Negative for muscle ache.   Skin: Negative for rash.   Allergic/Immunologic: Negative for seasonal allergies and asthma.   Hematologic/Lymphatic: Negative for swollen lymph nodes.       Objective:      Physical Exam   Constitutional: She is oriented to person, place, and time. She appears well-developed and well-nourished. She is cooperative.  Non-toxic appearance. She does not have a sickly appearance. She does not appear ill. No distress.   HENT:   Head: Normocephalic and atraumatic.   Right Ear: Hearing, tympanic membrane, external ear and ear canal normal.   Left Ear: " Hearing, tympanic membrane, external ear and ear canal normal.   Nose: Nose normal. No mucosal edema, rhinorrhea or nasal deformity. No epistaxis. Right sinus exhibits no maxillary sinus tenderness and no frontal sinus tenderness. Left sinus exhibits no maxillary sinus tenderness and no frontal sinus tenderness.   Mouth/Throat: Uvula is midline and mucous membranes are normal. No trismus in the jaw. Normal dentition. No uvula swelling. Posterior oropharyngeal erythema present. No oropharyngeal exudate or posterior oropharyngeal edema. Tonsils are 2+ on the right. Tonsils are 2+ on the left.   Eyes: Pupils are equal, round, and reactive to light. Conjunctivae, EOM and lids are normal. No scleral icterus.   Neck: Trachea normal, full passive range of motion without pain and phonation normal. Neck supple. No neck rigidity. No edema and no erythema present.   Cardiovascular: Normal rate, regular rhythm, normal heart sounds, intact distal pulses and normal pulses.   Pulmonary/Chest: Effort normal and breath sounds normal. No stridor. No respiratory distress. She has no decreased breath sounds. She has no wheezes. She has no rhonchi. She has no rales. She exhibits no tenderness.   Abdominal: Normal appearance.   Musculoskeletal: Normal range of motion. She exhibits no edema or deformity.   Neurological: She is alert and oriented to person, place, and time. She exhibits normal muscle tone. Coordination normal.   Skin: Skin is warm, dry, intact, not diaphoretic and not pale.   Psychiatric: She has a normal mood and affect. Her speech is normal and behavior is normal. Judgment and thought content normal. Cognition and memory are normal.   Nursing note and vitals reviewed.        Assessment:       1. Sore throat    2. Strep pharyngitis        Plan:         Sore throat  -     POCT rapid strep A    Strep pharyngitis  -     POCT urine pregnancy    Other orders  -     dexamethasone injection 8 mg  -     amoxicillin (AMOXIL) 875  MG tablet; Take 1 tablet (875 mg total) by mouth 2 (two) times daily. for 10 days  Dispense: 20 tablet; Refill: 0

## 2020-11-06 ENCOUNTER — OFFICE VISIT (OUTPATIENT)
Dept: URGENT CARE | Facility: CLINIC | Age: 25
End: 2020-11-06
Payer: MEDICAID

## 2020-11-06 VITALS
TEMPERATURE: 98 F | HEART RATE: 95 BPM | RESPIRATION RATE: 18 BRPM | OXYGEN SATURATION: 98 % | SYSTOLIC BLOOD PRESSURE: 127 MMHG | DIASTOLIC BLOOD PRESSURE: 73 MMHG

## 2020-11-06 DIAGNOSIS — J06.9 VIRAL UPPER RESPIRATORY ILLNESS: ICD-10-CM

## 2020-11-06 DIAGNOSIS — Z11.52 ENCOUNTER FOR SCREENING LABORATORY TESTING FOR COVID-19 VIRUS: Primary | ICD-10-CM

## 2020-11-06 LAB
CTP QC/QA: YES
SARS-COV-2 RDRP RESP QL NAA+PROBE: NEGATIVE

## 2020-11-06 PROCEDURE — 99213 OFFICE O/P EST LOW 20 MIN: CPT | Mod: S$GLB,,, | Performed by: NURSE PRACTITIONER

## 2020-11-06 PROCEDURE — 87635: ICD-10-PCS | Mod: QW,S$GLB,, | Performed by: NURSE PRACTITIONER

## 2020-11-06 PROCEDURE — 99213 PR OFFICE/OUTPT VISIT, EST, LEVL III, 20-29 MIN: ICD-10-PCS | Mod: S$GLB,,, | Performed by: NURSE PRACTITIONER

## 2020-11-06 PROCEDURE — 87635 SARS-COV-2 COVID-19 AMP PRB: CPT | Mod: QW,S$GLB,, | Performed by: NURSE PRACTITIONER

## 2020-11-06 NOTE — PROGRESS NOTES
Patient presented to [facility] for screening and testing. Two patient identifiers verified prior to specimen collection. Provider aware of pt vital signs. Questions answered and education on testing and receiving test results given. Pt expressed understanding.

## 2021-01-14 ENCOUNTER — OFFICE VISIT (OUTPATIENT)
Dept: URGENT CARE | Facility: CLINIC | Age: 26
End: 2021-01-14
Payer: MEDICAID

## 2021-01-14 VITALS
TEMPERATURE: 98 F | SYSTOLIC BLOOD PRESSURE: 118 MMHG | DIASTOLIC BLOOD PRESSURE: 79 MMHG | OXYGEN SATURATION: 98 % | RESPIRATION RATE: 18 BRPM | HEART RATE: 93 BPM

## 2021-01-14 DIAGNOSIS — Z20.822 LAB TEST NEGATIVE FOR COVID-19 VIRUS: ICD-10-CM

## 2021-01-14 DIAGNOSIS — R51.9 GENERALIZED HEADACHE: ICD-10-CM

## 2021-01-14 DIAGNOSIS — R09.81 SINUS CONGESTION: ICD-10-CM

## 2021-01-14 DIAGNOSIS — J01.90 ACUTE NON-RECURRENT SINUSITIS, UNSPECIFIED LOCATION: Primary | ICD-10-CM

## 2021-01-14 LAB
CTP QC/QA: YES
SARS-COV-2 RDRP RESP QL NAA+PROBE: NEGATIVE

## 2021-01-14 PROCEDURE — 99213 PR OFFICE/OUTPT VISIT, EST, LEVL III, 20-29 MIN: ICD-10-PCS | Mod: S$GLB,,, | Performed by: NURSE PRACTITIONER

## 2021-01-14 PROCEDURE — 99213 OFFICE O/P EST LOW 20 MIN: CPT | Mod: S$GLB,,, | Performed by: NURSE PRACTITIONER

## 2021-01-14 PROCEDURE — 87635 PR SARS-COV-2 COVID-19 AMPLIFIED PROBE: ICD-10-PCS | Mod: QW,S$GLB,, | Performed by: NURSE PRACTITIONER

## 2021-01-14 PROCEDURE — 87635 SARS-COV-2 COVID-19 AMP PRB: CPT | Mod: QW,S$GLB,, | Performed by: NURSE PRACTITIONER

## 2021-01-14 RX ORDER — PREDNISONE 20 MG/1
20 TABLET ORAL 2 TIMES DAILY
Qty: 10 TABLET | Refills: 0 | Status: SHIPPED | OUTPATIENT
Start: 2021-01-14 | End: 2021-01-19

## 2021-01-14 RX ORDER — DOXYCYCLINE 100 MG/1
100 CAPSULE ORAL 2 TIMES DAILY
Qty: 20 CAPSULE | Refills: 0 | Status: SHIPPED | OUTPATIENT
Start: 2021-01-14 | End: 2021-01-24

## 2021-06-04 ENCOUNTER — OFFICE VISIT (OUTPATIENT)
Dept: URGENT CARE | Facility: CLINIC | Age: 26
End: 2021-06-04
Payer: MEDICAID

## 2021-06-04 VITALS
SYSTOLIC BLOOD PRESSURE: 120 MMHG | OXYGEN SATURATION: 97 % | HEIGHT: 62 IN | WEIGHT: 188.81 LBS | BODY MASS INDEX: 34.74 KG/M2 | TEMPERATURE: 98 F | HEART RATE: 80 BPM | DIASTOLIC BLOOD PRESSURE: 74 MMHG

## 2021-06-04 DIAGNOSIS — B96.89 BACTERIAL SINUSITIS: Primary | ICD-10-CM

## 2021-06-04 DIAGNOSIS — J32.9 BACTERIAL SINUSITIS: Primary | ICD-10-CM

## 2021-06-04 DIAGNOSIS — R06.2 WHEEZING: ICD-10-CM

## 2021-06-04 LAB
B-HCG UR QL: NEGATIVE
CTP QC/QA: YES

## 2021-06-04 PROCEDURE — 81025 POCT URINE PREGNANCY: ICD-10-PCS | Mod: S$GLB,,, | Performed by: NURSE PRACTITIONER

## 2021-06-04 PROCEDURE — 99214 PR OFFICE/OUTPT VISIT, EST, LEVL IV, 30-39 MIN: ICD-10-PCS | Mod: 25,S$GLB,, | Performed by: NURSE PRACTITIONER

## 2021-06-04 PROCEDURE — 81025 URINE PREGNANCY TEST: CPT | Mod: S$GLB,,, | Performed by: NURSE PRACTITIONER

## 2021-06-04 PROCEDURE — 71046 X-RAY EXAM CHEST 2 VIEWS: CPT | Mod: S$GLB,,, | Performed by: RADIOLOGY

## 2021-06-04 PROCEDURE — 71046 XR CHEST PA AND LATERAL: ICD-10-PCS | Mod: S$GLB,,, | Performed by: RADIOLOGY

## 2021-06-04 PROCEDURE — 99214 OFFICE O/P EST MOD 30 MIN: CPT | Mod: 25,S$GLB,, | Performed by: NURSE PRACTITIONER

## 2021-06-04 RX ORDER — AMOXICILLIN AND CLAVULANATE POTASSIUM 875; 125 MG/1; MG/1
1 TABLET, FILM COATED ORAL 2 TIMES DAILY
Qty: 14 TABLET | Refills: 0 | Status: SHIPPED | OUTPATIENT
Start: 2021-06-04 | End: 2021-06-11

## 2021-06-04 RX ORDER — ALBUTEROL SULFATE 2.5 MG/.5ML
2.5 SOLUTION RESPIRATORY (INHALATION) EVERY 4 HOURS PRN
Qty: 30 EACH | Refills: 0 | Status: SHIPPED | OUTPATIENT
Start: 2021-06-04 | End: 2021-07-04

## 2021-06-04 RX ORDER — ALBUTEROL SULFATE 90 UG/1
2 AEROSOL, METERED RESPIRATORY (INHALATION) EVERY 6 HOURS PRN
Qty: 18 G | Refills: 0 | Status: SHIPPED | OUTPATIENT
Start: 2021-06-04 | End: 2022-06-04

## 2021-06-04 RX ORDER — DEXAMETHASONE SODIUM PHOSPHATE 4 MG/ML
8 INJECTION, SOLUTION INTRA-ARTICULAR; INTRALESIONAL; INTRAMUSCULAR; INTRAVENOUS; SOFT TISSUE ONCE
Status: COMPLETED | OUTPATIENT
Start: 2021-06-04 | End: 2021-06-04

## 2021-06-04 RX ADMIN — DEXAMETHASONE SODIUM PHOSPHATE 8 MG: 4 INJECTION, SOLUTION INTRA-ARTICULAR; INTRALESIONAL; INTRAMUSCULAR; INTRAVENOUS; SOFT TISSUE at 10:06
